# Patient Record
Sex: FEMALE | Race: WHITE | Employment: FULL TIME | URBAN - METROPOLITAN AREA
[De-identification: names, ages, dates, MRNs, and addresses within clinical notes are randomized per-mention and may not be internally consistent; named-entity substitution may affect disease eponyms.]

---

## 2017-08-03 ENCOUNTER — HOSPITAL ENCOUNTER (OUTPATIENT)
Dept: ULTRASOUND IMAGING | Age: 57
Discharge: HOME | End: 2017-08-03
Payer: COMMERCIAL

## 2017-08-03 ENCOUNTER — HOSPITAL ENCOUNTER (OUTPATIENT)
Dept: MAMMOGRAPHY | Age: 57
Discharge: HOME | End: 2017-08-03
Payer: COMMERCIAL

## 2017-08-03 DIAGNOSIS — N63.0 BREAST MASS: ICD-10-CM

## 2017-08-03 PROCEDURE — 76642 ULTRASOUND BREAST LIMITED: CPT

## 2017-08-03 PROCEDURE — 77065 DX MAMMO INCL CAD UNI: CPT

## 2017-08-09 ENCOUNTER — OFFICE VISIT (OUTPATIENT)
Dept: SURGERY | Age: 57
End: 2017-08-09

## 2017-08-09 VITALS
WEIGHT: 253 LBS | HEART RATE: 82 BPM | BODY MASS INDEX: 40.66 KG/M2 | SYSTOLIC BLOOD PRESSURE: 115 MMHG | DIASTOLIC BLOOD PRESSURE: 78 MMHG | HEIGHT: 66 IN

## 2017-08-09 DIAGNOSIS — N60.12 FIBROCYSTIC DISEASE OF LEFT BREAST: Primary | ICD-10-CM

## 2017-08-09 DIAGNOSIS — N64.4 MASTODYNIA OF LEFT BREAST: ICD-10-CM

## 2017-08-09 DIAGNOSIS — Z80.41 FAMILY HISTORY OF OVARIAN CANCER: ICD-10-CM

## 2017-08-09 RX ORDER — ESCITALOPRAM OXALATE 20 MG/1
TABLET ORAL
Refills: 5 | COMMUNITY
Start: 2017-07-12

## 2017-08-09 NOTE — LETTER
2017 4:26 PM 
 
Patient:  Jonathan Coy YOB: 1960 Date of Visit: 2017 Dear Jewel Mancia, [address] [place] [address] [address] [id number] VIA Facsimile: [phone] 
 : Thank you for referring Ms. Jonathan Coy to me for evaluation/treatment. Below are the relevant portions of my assessment and plan of care. HISTORY OF PRESENT ILLNESS Jonathan Coy is a 56 y.o. female. HPI  
NEW patient here today referred for consultation at the request of Dr. Josue Johnson for LEFT breast pain and lump. She started having pain in LEFT breast 1 month ago. She was then able to palpate a lump, which was tender to touch. Pain is slightly improved since first noticing. Denies any other breast problems at this time. She has been on estrogen for 14 years. Recently she altered her dosing in April. No history of breast biopsies. FH includes- 
Mother diagnosed with peritoneal/ovarian cancer at age 67, . Sister diagnosed with ovarian cancer at age 54, . Recent imaging- 
LEFT diagnostic mammogram and ultrasound on 8/3/17- BIRADS 2 
FINDINGS: No skin thickening or nipple retraction. No suspicious mass, cluster 
of pleomorphic calcifications, or architectural distortion to suggest 
malignancy. No mass or architectural distortion with compression. 
  
Left breast ultrasound at 1:30, 3 cm from the nipple: Oval, lobulated anechoic 
cyst with posterior acoustic enhancement measures 6 x 4 x 5 mm. No solid mass or 
pathologic shadowing. 
  
IMPRESSION:  
1. No mammographic or sonographic evidence of malignancy. 2. 6 mm cyst in the left breast at 1:30 may account for the palpable finding. 
  
Recommendation: 
Screening mammogram in one year. Monthly self breast exam. Any further 
management of the palpable area should be determined on a clinical basis. 
  
BI-RADS Assessment Category 2: Benign finding. Past Medical History:  
Diagnosis Date  Essential hypertension  Family history of skin cancer   
 mother and sister  Scarring  Skin cancer   
 squamous cell carcinoma  Sun-damaged skin Past Surgical History:  
Procedure Laterality Date [address]  HYSTERECTOMY  March 1997 Social History Social History  Marital status:  Spouse name: N/A  
 Number of children: N/A  
 Years of education: N/A Occupational History  Not on file. Social History Main Topics  Smoking status: Never Smoker  Smokeless tobacco: Not on file  Alcohol use Yes Comment: 2-3 glasses of wine per week  Drug use: Not on file  Sexual activity: Not on file Other Topics Concern  Not on file Social History Narrative OB History Obstetric Comments Menarche:  15. LMP: 38.  # of Children:  2. Age at Delivery of First Child:  34.   Hysterectomy/oophorectomy:  YES/YES. Breast Bx:  no.  Hx of Breast Feeding:  no. BCP:  yes. Hormone therapy:  yes. Current Outpatient Prescriptions:  
  escitalopram oxalate (LEXAPRO) 20 mg tablet, TAKE 1 TABLET EVERY DAY, Disp: , Rfl: 5 
  simvastatin (ZOCOR) 20 mg tablet, Take  by mouth nightly.  , Disp: , Rfl:  
  lisinopril (PRINIVIL, ZESTRIL) 10 mg tablet, Take  by mouth daily. , Disp: , Rfl:  
  omeprazole (PRILOSEC) 20 mg capsule, Take 20 mg by mouth daily. , Disp: , Rfl:  
  estradiol (ESTRACE) 0.5 mg tablet, Take  by mouth daily. , Disp: , Rfl:  
  multivitamin (ONE A DAY) tablet, Take 1 Tab by mouth daily. , Disp: , Rfl:  
  biotin 500 mcg cap, Take  by mouth.  , Disp: , Rfl:  
No Known Allergies Review of Systems Constitutional: Negative. HENT: Negative. Eyes: Negative. Respiratory: Positive for shortness of breath. Cardiovascular: Positive for leg swelling. Gastrointestinal: Positive for heartburn. Genitourinary: Negative. Musculoskeletal: Negative. Skin: Negative. Neurological: Positive for dizziness. Endo/Heme/Allergies: Negative. Psychiatric/Behavioral: Negative. Physical Exam  
Pulmonary/Chest: Right breast exhibits no inverted nipple, no mass, no nipple discharge, no skin change and no tenderness. Left breast exhibits no inverted nipple, no mass, no nipple discharge, no skin change and no tenderness. Breasts are symmetrical.  
Tender to deep palpation 3:00 left breast  
Lymphadenopathy:  
  She has no cervical adenopathy. She has no axillary adenopathy. Nursing note and vitals reviewed. ASSESSMENT and PLAN 
  ICD-10-CM ICD-9-CM 1. Fibrocystic disease of left breast N60.12 610.1 2. Mastodynia of left breast N64.4 611.71   
3. Family history of ovarian cancer Z80.41 V13.42   
 
55 yo female with left breast pain and cysts. On imaging and exam nothing worrisome. She is average risk on [facility] [place] risk assessment and does not qualify for breast mri. Discussed breast pain which is not typically a sign of breast cancer. I have given the patient a breast pain sheet. She may try vitamin E, Evening Primrose Oil, or decreased caffeine intake if breast pain persists. As far as the history of ovarian ca I did discuss genetic testing and recommend this. Will give her info and she will think about it. Follow-up with me in 6 months. If you have questions, please do not hesitate to call me. I look forward to following Ms. Farias along with you. Sincerely, Taryn Guerin MD

## 2017-08-09 NOTE — PATIENT INSTRUCTIONS
Breast Self-Exam: Care Instructions  Your Care Instructions  A breast self-exam is when you check your breasts for lumps or changes. This regular exam helps you learn how your breasts normally look and feel. Most breast problems or changes are not because of cancer. Breast self-exam is not a substitute for a mammogram. Having regular breast exams by your doctor and regular mammograms improve your chances of finding any problems with your breasts. Some women set a time each month to do a step-by-step breast self-exam. Other women like a less formal system. They might look at their breasts as they brush their teeth, or feel their breasts once in a while in the shower. If you notice a change in your breast, tell your doctor. Follow-up care is a key part of your treatment and safety. Be sure to make and go to all appointments, and call your doctor if you are having problems. Its also a good idea to know your test results and keep a list of the medicines you take. How do you do a breast self-exam?  · The best time to examine your breasts is usually one week after your menstrual period begins. Your breasts should not be tender then. If you do not have periods, you might do your exam on a day of the month that is easy to remember. · To examine your breasts:  ¨ Remove all your clothes above the waist and lie down. When you are lying down, your breast tissue spreads evenly over your chest wall, which makes it easier to feel all your breast tissue. ¨ Use the pads--not the fingertips--of the 3 middle fingers of your left hand to check your right breast. Move your fingers slowly in small coin-sized circles that overlap. ¨ Use three levels of pressure to feel of all your breast tissue. Use light pressure to feel the tissue close to the skin surface. Use medium pressure to feel a little deeper. Use firm pressure to feel your tissue close to your breastbone and ribs.  Use each pressure level to feel your breast tissue before moving on to the next spot. ¨ Check your entire breast, moving up and down as if following a strip from the collarbone to the bra line, and from the armpit to the ribs. Repeat until you have covered the entire breast.  ¨ Repeat this procedure for your left breast, using the pads of the 3 middle fingers of your right hand. · To examine your breasts while in the shower:  ¨ Place one arm over your head and lightly soap your breast on that side. ¨ Using the pads of your fingers, gently move your hand over your breast (in the strip pattern described above), feeling carefully for any lumps or changes. ¨ Repeat for the other breast.  · Have your doctor inspect anything you notice to see if you need further testing. Where can you learn more? Go to http://radha-barb.info/. Enter P148 in the search box to learn more about \"Breast Self-Exam: Care Instructions. \"  Current as of: July 26, 2016  Content Version: 11.3  © 3378-9958 [employer]. Care instructions adapted under license by [employer] (which disclaims liability or warranty for this information). If you have questions about a medical condition or this instruction, always ask your healthcare professional. [place] [address] any warranty or liability for your use of this information.

## 2017-08-09 NOTE — COMMUNICATION BODY
HISTORY OF PRESENT ILLNESS  Arturo Steven is a 56 y.o. female. HPI   NEW patient here today referred for consultation at the request of Dr. Nat Ashley for LEFT breast pain and lump. She started having pain in LEFT breast 1 month ago. She was then able to palpate a lump, which was tender to touch. Pain is slightly improved since first noticing. Denies any other breast problems at this time. She has been on estrogen for 14 years. Recently she altered her dosing in April. No history of breast biopsies. FH includes-  Mother diagnosed with peritoneal/ovarian cancer at age 67, . Sister diagnosed with ovarian cancer at age 54, . Recent imaging-  LEFT diagnostic mammogram and ultrasound on 8/3/17- BIRADS 2  FINDINGS: No skin thickening or nipple retraction. No suspicious mass, cluster  of pleomorphic calcifications, or architectural distortion to suggest  malignancy. No mass or architectural distortion with compression.     Left breast ultrasound at 1:30, 3 cm from the nipple: Oval, lobulated anechoic  cyst with posterior acoustic enhancement measures 6 x 4 x 5 mm. No solid mass or  pathologic shadowing.     IMPRESSION:   1. No mammographic or sonographic evidence of malignancy. 2. 6 mm cyst in the left breast at 1:30 may account for the palpable finding.     Recommendation:  Screening mammogram in one year. Monthly self breast exam. Any further  management of the palpable area should be determined on a clinical basis.     BI-RADS Assessment Category 2: Benign finding.   Past Medical History:   Diagnosis Date    Essential hypertension     Family history of skin cancer     mother and sister    Scarring     Skin cancer     squamous cell carcinoma     Sun-damaged skin      Past Surgical History:   Procedure Laterality Date    HX  SECTION   and     HX HYSTERECTOMY  1997     Social History     Social History    Marital status:      Spouse name: N/A    Number of children: N/A    Years of education: N/A     Occupational History    Not on file. Social History Main Topics    Smoking status: Never Smoker    Smokeless tobacco: Not on file    Alcohol use Yes      Comment: 2-3 glasses of wine per week     Drug use: Not on file    Sexual activity: Not on file     Other Topics Concern    Not on file     Social History Narrative     OB History     Obstetric Comments    Menarche:  15. LMP: 38.  # of Children:  2. Age at Delivery of First Child:  34.   Hysterectomy/oophorectomy:  YES/YES. Breast Bx:  no.  Hx of Breast Feeding:  no. BCP:  yes. Hormone therapy:  yes. Current Outpatient Prescriptions:     escitalopram oxalate (LEXAPRO) 20 mg tablet, TAKE 1 TABLET EVERY DAY, Disp: , Rfl: 5    simvastatin (ZOCOR) 20 mg tablet, Take  by mouth nightly.  , Disp: , Rfl:     lisinopril (PRINIVIL, ZESTRIL) 10 mg tablet, Take  by mouth daily. , Disp: , Rfl:     omeprazole (PRILOSEC) 20 mg capsule, Take 20 mg by mouth daily. , Disp: , Rfl:     estradiol (ESTRACE) 0.5 mg tablet, Take  by mouth daily. , Disp: , Rfl:     multivitamin (ONE A DAY) tablet, Take 1 Tab by mouth daily. , Disp: , Rfl:     biotin 500 mcg cap, Take  by mouth.  , Disp: , Rfl:   No Known Allergies    Review of Systems   Constitutional: Negative. HENT: Negative. Eyes: Negative. Respiratory: Positive for shortness of breath. Cardiovascular: Positive for leg swelling. Gastrointestinal: Positive for heartburn. Genitourinary: Negative. Musculoskeletal: Negative. Skin: Negative. Neurological: Positive for dizziness. Endo/Heme/Allergies: Negative. Psychiatric/Behavioral: Negative. Physical Exam   Pulmonary/Chest: Right breast exhibits no inverted nipple, no mass, no nipple discharge, no skin change and no tenderness. Left breast exhibits no inverted nipple, no mass, no nipple discharge, no skin change and no tenderness.  Breasts are symmetrical.   Tender to deep palpation 3:00 left breast   Lymphadenopathy:     She has no cervical adenopathy. She has no axillary adenopathy. Nursing note and vitals reviewed. ASSESSMENT and PLAN    ICD-10-CM ICD-9-CM    1. Fibrocystic disease of left breast N60.12 610.1    2. Mastodynia of left breast N64.4 611.71    3. Family history of ovarian cancer Z80.41 V13.42      55 yo female with left breast pain and cysts. On imaging and exam nothing worrisome. She is average risk on [facility] [place] risk assessment and does not qualify for breast mri. Discussed breast pain which is not typically a sign of breast cancer. I have given the patient a breast pain sheet. She may try vitamin E, Evening Primrose Oil, or decreased caffeine intake if breast pain persists. As far as the history of ovarian ca I did discuss genetic testing and recommend this. Will give her info and she will think about it. Follow-up with me in 6 months.

## 2017-08-09 NOTE — PROGRESS NOTES
HISTORY OF PRESENT ILLNESS  Arturo Steven is a 56 y.o. female. HPI   NEW patient here today referred for consultation at the request of Dr. Nat Ashley for LEFT breast pain and lump. She started having pain in LEFT breast 1 month ago. She was then able to palpate a lump, which was tender to touch. Pain is slightly improved since first noticing. Denies any other breast problems at this time. She has been on estrogen for 14 years. Recently she altered her dosing in April. No history of breast biopsies. FH includes-  Mother diagnosed with peritoneal/ovarian cancer at age 67, . Sister diagnosed with ovarian cancer at age 54, . Recent imaging-  LEFT diagnostic mammogram and ultrasound on 8/3/17- BIRADS 2  FINDINGS: No skin thickening or nipple retraction. No suspicious mass, cluster  of pleomorphic calcifications, or architectural distortion to suggest  malignancy. No mass or architectural distortion with compression.     Left breast ultrasound at 1:30, 3 cm from the nipple: Oval, lobulated anechoic  cyst with posterior acoustic enhancement measures 6 x 4 x 5 mm. No solid mass or  pathologic shadowing.     IMPRESSION:   1. No mammographic or sonographic evidence of malignancy. 2. 6 mm cyst in the left breast at 1:30 may account for the palpable finding.     Recommendation:  Screening mammogram in one year. Monthly self breast exam. Any further  management of the palpable area should be determined on a clinical basis.     BI-RADS Assessment Category 2: Benign finding.   Past Medical History:   Diagnosis Date    Essential hypertension     Family history of skin cancer     mother and sister    Scarring     Skin cancer     squamous cell carcinoma     Sun-damaged skin      Past Surgical History:   Procedure Laterality Date    HX  SECTION   and     HX HYSTERECTOMY  1997     Social History     Social History    Marital status:      Spouse name: N/A    Number of children: N/A    Years of education: N/A     Occupational History    Not on file. Social History Main Topics    Smoking status: Never Smoker    Smokeless tobacco: Not on file    Alcohol use Yes      Comment: 2-3 glasses of wine per week     Drug use: Not on file    Sexual activity: Not on file     Other Topics Concern    Not on file     Social History Narrative     OB History     Obstetric Comments    Menarche:  15. LMP: 38.  # of Children:  2. Age at Delivery of First Child:  34.   Hysterectomy/oophorectomy:  YES/YES. Breast Bx:  no.  Hx of Breast Feeding:  no. BCP:  yes. Hormone therapy:  yes. Current Outpatient Prescriptions:     escitalopram oxalate (LEXAPRO) 20 mg tablet, TAKE 1 TABLET EVERY DAY, Disp: , Rfl: 5    simvastatin (ZOCOR) 20 mg tablet, Take  by mouth nightly.  , Disp: , Rfl:     lisinopril (PRINIVIL, ZESTRIL) 10 mg tablet, Take  by mouth daily. , Disp: , Rfl:     omeprazole (PRILOSEC) 20 mg capsule, Take 20 mg by mouth daily. , Disp: , Rfl:     estradiol (ESTRACE) 0.5 mg tablet, Take  by mouth daily. , Disp: , Rfl:     multivitamin (ONE A DAY) tablet, Take 1 Tab by mouth daily. , Disp: , Rfl:     biotin 500 mcg cap, Take  by mouth.  , Disp: , Rfl:   No Known Allergies    Review of Systems   Constitutional: Negative. HENT: Negative. Eyes: Negative. Respiratory: Positive for shortness of breath. Cardiovascular: Positive for leg swelling. Gastrointestinal: Positive for heartburn. Genitourinary: Negative. Musculoskeletal: Negative. Skin: Negative. Neurological: Positive for dizziness. Endo/Heme/Allergies: Negative. Psychiatric/Behavioral: Negative. Physical Exam   Pulmonary/Chest: Right breast exhibits no inverted nipple, no mass, no nipple discharge, no skin change and no tenderness. Left breast exhibits no inverted nipple, no mass, no nipple discharge, no skin change and no tenderness.  Breasts are symmetrical.   Tender to deep palpation 3:00 left breast   Lymphadenopathy:     She has no cervical adenopathy. She has no axillary adenopathy. Nursing note and vitals reviewed. ASSESSMENT and PLAN    ICD-10-CM ICD-9-CM    1. Fibrocystic disease of left breast N60.12 610.1    2. Mastodynia of left breast N64.4 611.71    3. Family history of ovarian cancer Z80.41 V13.42      55 yo female with left breast pain and cysts. On imaging and exam nothing worrisome. She is average risk on [facility] [place] risk assessment and does not qualify for breast mri. Discussed breast pain which is not typically a sign of breast cancer. I have given the patient a breast pain sheet. She may try vitamin E, Evening Primrose Oil, or decreased caffeine intake if breast pain persists. As far as the history of ovarian ca I did discuss genetic testing and recommend this. Will give her info and she will think about it. Follow-up with me in 6 months.

## 2019-10-10 ENCOUNTER — HOSPITAL ENCOUNTER (OUTPATIENT)
Dept: MAMMOGRAPHY | Age: 59
Discharge: HOME | End: 2019-10-10
Attending: NURSE PRACTITIONER
Payer: COMMERCIAL

## 2019-10-10 DIAGNOSIS — Z12.31 ENCOUNTER FOR SCREENING MAMMOGRAM FOR BREAST CANCER: ICD-10-CM

## 2019-10-10 PROCEDURE — 77067 SCR MAMMO BI INCL CAD: CPT

## 2019-11-04 ENCOUNTER — HOSPITAL ENCOUNTER (OUTPATIENT)
Dept: MAMMOGRAPHY | Age: 59
Discharge: HOME | End: 2019-11-04
Attending: NURSE PRACTITIONER
Payer: COMMERCIAL

## 2019-11-04 DIAGNOSIS — R92.8 ABNORMAL MAMMOGRAM: ICD-10-CM

## 2019-11-04 PROCEDURE — 77061 BREAST TOMOSYNTHESIS UNI: CPT

## 2019-11-18 ENCOUNTER — HOSPITAL ENCOUNTER (OUTPATIENT)
Dept: GENERAL RADIOLOGY | Age: 59
Discharge: HOME | End: 2019-11-18
Attending: PHYSICIAN ASSISTANT
Payer: COMMERCIAL

## 2019-11-18 ENCOUNTER — HOSPITAL ENCOUNTER (OUTPATIENT)
Dept: GENERAL RADIOLOGY | Age: 59
Discharge: HOME | End: 2019-11-18
Attending: SPECIALIST
Payer: COMMERCIAL

## 2019-11-18 DIAGNOSIS — R13.10 DYSPHAGIA: ICD-10-CM

## 2019-11-18 DIAGNOSIS — K59.04 CHRONIC IDIOPATHIC CONSTIPATION: ICD-10-CM

## 2019-11-18 DIAGNOSIS — K21.9 GASTROESOPHAGEAL REFLUX DISEASE: ICD-10-CM

## 2019-11-18 DIAGNOSIS — Z80.9 FAMILY HISTORY OF UNSPECIFIED MALIGNANT NEOPLASM: ICD-10-CM

## 2019-11-18 DIAGNOSIS — K21.9 GERD (GASTROESOPHAGEAL REFLUX DISEASE): ICD-10-CM

## 2019-11-18 PROCEDURE — 74220 X-RAY XM ESOPHAGUS 1CNTRST: CPT

## 2019-11-18 PROCEDURE — 74018 RADEX ABDOMEN 1 VIEW: CPT

## 2019-11-18 PROCEDURE — 92611 MOTION FLUOROSCOPY/SWALLOW: CPT

## 2019-11-18 PROCEDURE — 74230 X-RAY XM SWLNG FUNCJ C+: CPT

## 2020-10-02 ENCOUNTER — APPOINTMENT (OUTPATIENT)
Dept: GENERAL RADIOLOGY | Age: 60
End: 2020-10-02
Attending: PHYSICIAN ASSISTANT
Payer: COMMERCIAL

## 2020-10-02 ENCOUNTER — APPOINTMENT (OUTPATIENT)
Dept: ULTRASOUND IMAGING | Age: 60
End: 2020-10-02
Attending: PHYSICIAN ASSISTANT
Payer: COMMERCIAL

## 2020-10-02 ENCOUNTER — HOSPITAL ENCOUNTER (EMERGENCY)
Age: 60
Discharge: HOME | End: 2020-10-02
Attending: EMERGENCY MEDICINE
Payer: COMMERCIAL

## 2020-10-02 VITALS
HEART RATE: 86 BPM | SYSTOLIC BLOOD PRESSURE: 130 MMHG | BODY MASS INDEX: 39.22 KG/M2 | TEMPERATURE: 98.1 F | RESPIRATION RATE: 16 BRPM | OXYGEN SATURATION: 96 % | DIASTOLIC BLOOD PRESSURE: 77 MMHG | WEIGHT: 244.05 LBS | HEIGHT: 66 IN

## 2020-10-02 DIAGNOSIS — M25.462 PAIN AND SWELLING OF LEFT KNEE: Primary | ICD-10-CM

## 2020-10-02 DIAGNOSIS — M25.562 PAIN AND SWELLING OF LEFT KNEE: Primary | ICD-10-CM

## 2020-10-02 PROCEDURE — 99283 EMERGENCY DEPT VISIT LOW MDM: CPT

## 2020-10-02 PROCEDURE — 93971 EXTREMITY STUDY: CPT

## 2020-10-02 PROCEDURE — 73562 X-RAY EXAM OF KNEE 3: CPT

## 2020-10-02 NOTE — DISCHARGE INSTRUCTIONS
Patient Education        Knee Pain or Injury: Care Instructions  Your Care Instructions     Injuries are a common cause of knee problems. Sudden (acute) injuries may be caused by a direct blow to the knee. They can also be caused by abnormal twisting, bending, or falling on the knee. Pain, bruising, or swelling may be severe, and may start within minutes of the injury. Overuse is another cause of knee pain. Other causes are climbing stairs, kneeling, and other activities that use the knee. Everyday wear and tear, especially as you get older, also can cause knee pain. Rest, along with home treatment, often relieves pain and allows your knee to heal. If you have a serious knee injury, you may need tests and treatment. Follow-up care is a key part of your treatment and safety. Be sure to make and go to all appointments, and call your doctor if you are having problems. It's also a good idea to know your test results and keep a list of the medicines you take. How can you care for yourself at home? · Be safe with medicines. Read and follow all instructions on the label. ? If the doctor gave you a prescription medicine for pain, take it as prescribed. ? If you are not taking a prescription pain medicine, ask your doctor if you can take an over-the-counter medicine. · Rest and protect your knee. Take a break from any activity that may cause pain. · Put ice or a cold pack on your knee for 10 to 20 minutes at a time. Put a thin cloth between the ice and your skin. · Prop up a sore knee on a pillow when you ice it or anytime you sit or lie down for the next 3 days. Try to keep it above the level of your heart. This will help reduce swelling. · If your knee is not swollen, you can put moist heat, a heating pad, or a warm cloth on your knee. · If your doctor recommends an elastic bandage, sleeve, or other type of support for your knee, wear it as directed.   · Follow your doctor's instructions about how much weight you can put on your leg. Use a cane, crutches, or a walker as instructed. · Follow your doctor's instructions about activity during your healing process. If you can do mild exercise, slowly increase your activity. · Reach and stay at a healthy weight. Extra weight can strain the joints, especially the knees and hips, and make the pain worse. Losing even a few pounds may help. When should you call for help? Call 911 anytime you think you may need emergency care. For example, call if:    · You have symptoms of a blood clot in your lung (called a pulmonary embolism). These may include:  ? Sudden chest pain. ? Trouble breathing. ? Coughing up blood. Call your doctor now or seek immediate medical care if:    · You have severe or increasing pain.     · Your leg or foot turns cold or changes color.     · You cannot stand or put weight on your knee.     · Your knee looks twisted or bent out of shape.     · You cannot move your knee.     · You have signs of infection, such as:  ? Increased pain, swelling, warmth, or redness. ? Red streaks leading from the knee. ? Pus draining from a place on your knee. ? A fever.     · You have signs of a blood clot in your leg (called a deep vein thrombosis), such as:  ? Pain in your calf, back of the knee, thigh, or groin. ? Redness and swelling in your leg or groin. Watch closely for changes in your health, and be sure to contact your doctor if:    · You have tingling, weakness, or numbness in your knee.     · You have any new symptoms, such as swelling.     · You have bruises from a knee injury that last longer than 2 weeks.     · You do not get better as expected. Where can you learn more? Go to http://www.gray.com/  Enter K195 in the search box to learn more about \"Knee Pain or Injury: Care Instructions. \"  Current as of: June 26, 2019               Content Version: 12.6  © 1284-3955 [employer].    Care instructions adapted under license by [address] (which disclaims liability or warranty for this information). If you have questions about a medical condition or this instruction, always ask your healthcare professional. [place] [address] any warranty or liability for your use of this information. Xr Knee Lt 3 V    Result Date: 10/2/2020  IMPRESSION: No acute abnormality. Left Lower Venous Doppler    No evidence of deep vein thrombosis in the common femoral, profunda femoral, femoral, popliteal, posterior tibial, and peroneal veins. The veins were imaged in the transverse and longitudinal planes. The vessels showed normal color filling and compressibility. Doppler interrogation showed phasic and spontaneous flow.

## 2020-10-02 NOTE — ED PROVIDER NOTES
EMERGENCY DEPARTMENT HISTORY AND PHYSICAL EXAM      Date: 10/2/2020  Patient Name: Lise Willis    History of Presenting Illness     Chief Complaint   Patient presents with    Knee Pain     Pain at posterior left knee radiating to front for 3-4 days, swelling reported       History Provided By: Patient and Patient's     HPI: Lise Willis, 64 y.o. female with PMHx significant for hypertension, hypercholesterolemia, presents to the ED with cc of left knee pain and swelling onset 4 days ago. Symptoms have been persistent. Pain is worse with ambulation and movement of the joint. She cannot think of anything that she did to injure it but she was diagnosed with osteoarthritis years ago and was on her feet for approximately 14 hours at her son's rehearsal dinner 2 weeks ago. Her sister was diagnosed with a DVT 1 year ago and she is concerned that she could have one too. She denies history of DVT, recent immobilization, hormonal medication use. She denies fevers. There are no other complaints, changes, or physical findings at this time. PCP: Lindsay Jean Baptiste NP    No current facility-administered medications on file prior to encounter. Current Outpatient Medications on File Prior to Encounter   Medication Sig Dispense Refill    escitalopram oxalate (LEXAPRO) 20 mg tablet TAKE 1 TABLET EVERY DAY  5    simvastatin (ZOCOR) 20 mg tablet Take  by mouth nightly.  lisinopril (PRINIVIL, ZESTRIL) 10 mg tablet Take  by mouth daily.  omeprazole (PRILOSEC) 20 mg capsule Take 20 mg by mouth daily.  estradiol (ESTRACE) 0.5 mg tablet Take  by mouth daily.  multivitamin (ONE A DAY) tablet Take 1 Tab by mouth daily.  biotin 500 mcg cap Take  by mouth.            Past History     Past Medical History:  Past Medical History:   Diagnosis Date    Essential hypertension     Family history of skin cancer     mother and sister    Scarring     Skin cancer     squamous cell carcinoma  Sun-damaged skin        Past Surgical History:  Past Surgical History:   Procedure Laterality Date    HX  SECTION   and     HX HYSTERECTOMY  1997       Family History:  Family History   Problem Relation Age of Onset    Diabetes Mother     Cancer Mother         peritoneal    Heart Disease Father     Ovarian Cancer Sister     Diabetes Brother     Heart Disease Brother     Heart Disease Brother     Diabetes Brother        Social History:  Social History     Tobacco Use    Smoking status: Never Smoker   Substance Use Topics    Alcohol use: Yes     Comment: 2-3 glasses of wine per week     Drug use: Not on file       Allergies:  No Known Allergies      Review of Systems   Review of Systems   Constitutional: Negative for chills and fever. HENT: Negative for ear pain and sore throat. Eyes: Negative for redness and visual disturbance. Respiratory: Negative for cough and shortness of breath. Cardiovascular: Negative for chest pain and palpitations. Gastrointestinal: Negative for abdominal pain, nausea and vomiting. Genitourinary: Negative for dysuria and hematuria. Musculoskeletal: Positive for arthralgias and joint swelling. Negative for back pain and gait problem. Skin: Negative for rash and wound. Neurological: Negative for dizziness and headaches. Psychiatric/Behavioral: Negative for behavioral problems and confusion. All other systems reviewed and are negative. Physical Exam   Physical Exam  Constitutional:       Appearance: She is not toxic-appearing. Comments: Conversant female no acute distress. HENT:      Head: Normocephalic and atraumatic. Mouth/Throat:      Mouth: Mucous membranes are moist.   Eyes:      Extraocular Movements: Extraocular movements intact. Pupils: Pupils are equal, round, and reactive to light. Neck:      Musculoskeletal: Normal range of motion and neck supple.    Cardiovascular:      Rate and Rhythm: Normal rate and regular rhythm. Pulmonary:      Effort: Pulmonary effort is normal. No respiratory distress. Musculoskeletal:      Comments: Soft tissue swelling noted to the left knee. Tenderness to palpation over the posterior knee and over the medial and lateral joint lines. Full range of motion in the joint without instability. No erythema or warmth. Mild tenderness to palpation in the proximal calf. 2+ dorsalis pedis pulse. Skin:     General: Skin is warm and dry. Neurological:      General: No focal deficit present. Mental Status: She is alert and oriented to person, place, and time. Psychiatric:         Behavior: Behavior normal.           Diagnostic Study Results     Labs -   No results found for this or any previous visit (from the past 12 hour(s)). Radiologic Studies -   XR KNEE LT 3 V   Final Result   IMPRESSION: No acute abnormality. CT Results  (Last 48 hours)    None        CXR Results  (Last 48 hours)    None            Medical Decision Making   I am the first provider for this patient. I reviewed the vital signs, available nursing notes, past medical history, past surgical history, family history and social history. Vital Signs-Reviewed the patient's vital signs. Patient Vitals for the past 12 hrs:   Temp Pulse Resp BP SpO2   10/02/20 1059 98.1 °F (36.7 °C) 86 16 130/77 96 %         Records Reviewed: Nursing Notes and Old Medical Records      Provider Notes (Medical Decision Making):   DDx: Osteoarthritis, meniscus tear, sprain, DVT    Patient is afebrile and clinically well-appearing. No erythema or warmth to the joint and she has full range of motion. Plan for x-ray and Doppler ultrasound. She declines analgesia at this time. ED Course:   Initial assessment performed. The patients presenting problems have been discussed, and they are in agreement with the care plan formulated and outlined with them.   I have encouraged them to ask questions as they arise throughout their visit. 1:15 PM - Discussed that doppler is negative for DVT. Will apply knee immobilizer, discussed RICE. Will give ortho referral for further evaluation and management. Disposition:  1:20 PM  The patient has been re-evaluated and is ready for discharge. Reviewed available results with patient. Counseled patient on diagnosis and care plan. Patient has expressed understanding, and all questions have been answered. Patient agrees with plan and agrees to follow up as recommended, or to return to the ED if their symptoms worsen. Discharge instructions have been provided and explained to the patient, along with reasons to return to the ED. PLAN:  1. Discharge Medication List as of 10/2/2020  1:17 PM        2. Follow-up Information     Follow up With Specialties Details Why [address] [facility] [place]  Call to schedule a follow up appointment [address] [facility] [place] [address] [facility]    [address] DEPT Emergency Medicine Go to If symptoms worsen [address] [place] [address] Drive  [address] [facility]  [phone]        Return to ED if worse     Diagnosis     Clinical Impression:   1. Pain and swelling of left knee            Edwina Marie.  PEDRO Delacruz

## 2020-10-02 NOTE — ED NOTES
11: 08 Assumed care of pt. Plan of care discussed. Call bell in reach. Will continue to monitor. PA at bedside.

## 2020-12-08 ENCOUNTER — HOSPITAL ENCOUNTER (OUTPATIENT)
Dept: PREADMISSION TESTING | Age: 60
Discharge: HOME | End: 2020-12-08
Payer: COMMERCIAL

## 2020-12-08 VITALS
WEIGHT: 245.15 LBS | BODY MASS INDEX: 39.4 KG/M2 | TEMPERATURE: 98.2 F | SYSTOLIC BLOOD PRESSURE: 130 MMHG | RESPIRATION RATE: 18 BRPM | HEART RATE: 85 BPM | HEIGHT: 66 IN | DIASTOLIC BLOOD PRESSURE: 82 MMHG

## 2020-12-08 LAB
ABO + RH BLD: NORMAL
ANION GAP SERPL CALC-SCNC: 4 MMOL/L (ref 5–15)
APPEARANCE UR: CLEAR
BACTERIA URNS QL MICRO: NEGATIVE /HPF
BILIRUB UR QL: NEGATIVE
BLOOD GROUP ANTIBODIES SERPL: NORMAL
BUN SERPL-MCNC: 20 MG/DL (ref 6–20)
BUN/CREAT SERPL: 22 (ref 12–20)
CALCIUM SERPL-MCNC: 9.1 MG/DL (ref 8.5–10.1)
CHLORIDE SERPL-SCNC: 108 MMOL/L (ref 97–108)
CO2 SERPL-SCNC: 27 MMOL/L (ref 21–32)
COLOR UR: ABNORMAL
CREAT SERPL-MCNC: 0.9 MG/DL (ref 0.55–1.02)
EPITH CASTS URNS QL MICRO: ABNORMAL /LPF
ERYTHROCYTE [DISTWIDTH] IN BLOOD BY AUTOMATED COUNT: 13.1 % (ref 11.5–14.5)
EST. AVERAGE GLUCOSE BLD GHB EST-MCNC: 117 MG/DL
GLUCOSE SERPL-MCNC: 82 MG/DL (ref 65–100)
GLUCOSE UR STRIP.AUTO-MCNC: NEGATIVE MG/DL
HBA1C MFR BLD: 5.7 % (ref 4–5.6)
HCT VFR BLD AUTO: 39 % (ref 35–47)
HGB BLD-MCNC: 12.7 G/DL (ref 11.5–16)
HGB UR QL STRIP: ABNORMAL
HYALINE CASTS URNS QL MICRO: ABNORMAL /LPF (ref 0–5)
INR PPP: 1 (ref 0.9–1.1)
KETONES UR QL STRIP.AUTO: ABNORMAL MG/DL
LEUKOCYTE ESTERASE UR QL STRIP.AUTO: NEGATIVE
MCH RBC QN AUTO: 30.2 PG (ref 26–34)
MCHC RBC AUTO-ENTMCNC: 32.6 G/DL (ref 30–36.5)
MCV RBC AUTO: 92.9 FL (ref 80–99)
NITRITE UR QL STRIP.AUTO: NEGATIVE
NRBC # BLD: 0 K/UL (ref 0–0.01)
NRBC BLD-RTO: 0 PER 100 WBC
PH UR STRIP: 5 [PH] (ref 5–8)
PLATELET # BLD AUTO: 288 K/UL (ref 150–400)
PMV BLD AUTO: 10.3 FL (ref 8.9–12.9)
POTASSIUM SERPL-SCNC: 4.1 MMOL/L (ref 3.5–5.1)
PROT UR STRIP-MCNC: NEGATIVE MG/DL
PROTHROMBIN TIME: 10.9 SEC (ref 9–11.1)
RBC # BLD AUTO: 4.2 M/UL (ref 3.8–5.2)
RBC #/AREA URNS HPF: ABNORMAL /HPF (ref 0–5)
SODIUM SERPL-SCNC: 139 MMOL/L (ref 136–145)
SP GR UR REFRACTOMETRY: 1.02 (ref 1–1.03)
SPECIMEN EXP DATE BLD: NORMAL
UA: UC IF INDICATED,UAUC: ABNORMAL
UROBILINOGEN UR QL STRIP.AUTO: 1 EU/DL (ref 0.2–1)
WBC # BLD AUTO: 7.6 K/UL (ref 3.6–11)
WBC URNS QL MICRO: ABNORMAL /HPF (ref 0–4)

## 2020-12-08 PROCEDURE — 81001 URINALYSIS AUTO W/SCOPE: CPT

## 2020-12-08 PROCEDURE — 85610 PROTHROMBIN TIME: CPT

## 2020-12-08 PROCEDURE — 86900 BLOOD TYPING SEROLOGIC ABO: CPT

## 2020-12-08 PROCEDURE — 85027 COMPLETE CBC AUTOMATED: CPT

## 2020-12-08 PROCEDURE — 36415 COLL VENOUS BLD VENIPUNCTURE: CPT

## 2020-12-08 PROCEDURE — 83036 HEMOGLOBIN GLYCOSYLATED A1C: CPT

## 2020-12-08 PROCEDURE — 80048 BASIC METABOLIC PNL TOTAL CA: CPT

## 2020-12-08 PROCEDURE — 93005 ELECTROCARDIOGRAM TRACING: CPT

## 2020-12-08 RX ORDER — CHOLECALCIFEROL (VITAMIN D3) 125 MCG
100 CAPSULE ORAL 2 TIMES DAILY
COMMUNITY

## 2020-12-08 RX ORDER — VENLAFAXINE HYDROCHLORIDE 37.5 MG/1
CAPSULE, EXTENDED RELEASE ORAL
COMMUNITY

## 2020-12-08 RX ORDER — CETIRIZINE HCL 10 MG
10 TABLET ORAL DAILY
COMMUNITY

## 2020-12-08 RX ORDER — LANSOPRAZOLE 15 MG/1
15 TABLET, ORALLY DISINTEGRATING, DELAYED RELEASE ORAL
COMMUNITY

## 2020-12-08 NOTE — PERIOP NOTES
PREOPERATIVE INSTRUCTIONS REVIEWED WITH PATIENT. INSTRUCTIONS (link to online class given to pt) ON USE OF CHG SOAP. TWO BOTTLES OF CHG SOAP GIVEN. PATIENT GIVEN SSI INFECTIONS SHEET, AS WELL AS HAND WASHING TIPS SHEET. MRSA/MSSA TREATMENT INSTRUCTION SHEET GIVEN WITH AN EXPLANATION TO PATIENT THAT THEY WILL BE NOTIFIED IF TREATMENT INSTRUCTIONS NEED TO BE INITIATED. PATIENT WAS GIVEN THE OPPORTUNITY TO ASK QUESTIONS, BASED ON THE INFORMATION PROVIDED.

## 2020-12-09 LAB
ATRIAL RATE: 75 BPM
BACTERIA SPEC CULT: NORMAL
BACTERIA SPEC CULT: NORMAL
CALCULATED P AXIS, ECG09: 53 DEGREES
CALCULATED R AXIS, ECG10: 4 DEGREES
CALCULATED T AXIS, ECG11: 35 DEGREES
DIAGNOSIS, 93000: NORMAL
P-R INTERVAL, ECG05: 140 MS
Q-T INTERVAL, ECG07: 404 MS
QRS DURATION, ECG06: 90 MS
QTC CALCULATION (BEZET), ECG08: 451 MS
SERVICE CMNT-IMP: NORMAL
VENTRICULAR RATE, ECG03: 75 BPM

## 2020-12-09 NOTE — H&P
Jessica Barakat  : 1960   / Language: English / Race: White  Female      History of Present Illness The patient is a 64year old female who presents with a complaint of MRI of the left knee. Patient's chief complaint is left knee pain. Kayla Montano is here for return and MRI results. Donna Mancuso reviewed her MRI which showed anterior medial osteoarthritis of the knee with meniscal subluxation.  She has not improved with the therapy.  The injection helped for only a few weeks.  Pain is anterior medial.  Associated with swelling and dysfunction. Problem List/Past Medical   Pain of right heel (729.5  M79.671)    Obesity (BMI 30-39.9) (278.00  E66.9)    Hypertension, goal below 140/90 (401.9  I10)    Right medial knee pain (719.46  M25.561)    History of foot surgery (V15.29  Z98.890)    Plantar fasciitis, right (728.71  M72.2)    REVIEW OF SYSTEMS: Systems were reviewed by the provider.    Knee effusion, left (719.06  M25.462)    Weight above 97th percentile (V49.89  Z78.9)    Degenerative tear of medial meniscus, left (717.3  M23.204)    Synovitis of left knee (727.09  M65.9)    Chronic right shoulder pain (719.41  M25.511)    Shoulder impingement, right (726.2  M75.41)    Superior glenoid labrum lesion of right shoulder, initial encounter (840.7  S43.431A)      Allergies  Sulfa 10 *OPHTHALMIC AGENTS*    Allergies Reconciled      Family History   Cancer   Mother, Sister. Diabetes Mellitus   Brother, Mother. Heart Disease   Father. Heart disease in male family member before age 54    Hypertension   Mother. Social History  Exercise   3-4 times per week, walking. Marital status   . No drug use    Current work status   Part-time. Alcohol use   1-2 drinks per occasion, 3 times, Drinks wine, Never drinks more than 5 drinks per occasion, per week. Caffeine use   1-2 drinks per day, Carbonated beverages, Coffee. Tobacco use   Never smoker. Seat Belt Use   Always uses seat belts.   Brayden Weir Exposure   Occasionally. Tobacco / smoke exposure   Family members smoke outdoors only. Medication History  LORazepam  (1MG Tablet, Oral) Active. Amoxicillin  (500MG Capsule, Oral) Active. Chlorhexidine Gluconate  (0.12% Solution, Mouth/Throat) Active. Ibuprofen  (800MG Tablet, Oral) Active. Venlafaxine HCl  (37.5MG Tablet, Oral) Active. Medications Reconciled     Pregnancy / Birth History  Pregnant   No.    Past Surgical History   Delivery   2 times  Hysterectomy   non-cancerous: vaginal  Other Joint Surgery      Other Problems   Skin Cancer    Gastroesophageal Reflux Disease    High blood pressure    Hypercholesterolemia    Depression    Treatment options were discussed with the patient in full.        Review of Systems   General Not Present- Chills and Fatigue. Skin Not Present- Bruising, Pallor and Skin Color Changes. Respiratory Not Present- Cough and Difficulty Breathing. Cardiovascular Not Present- Chest Pain, Fainting / Blacking Out and Rapid Heart Rate. Musculoskeletal Present- Joint Pain. Not Present- Decreased Range of Motion and Joint Swelling. Neurological Not Present- Dysesthesia, Paresthesias and Weakness In Extremities. Hematology Not Present- Abnormal Bleeding, Blood Clots and Petechiae. Vitals   Weight: 243 lb   Height: 66 in   Weight was reported by patient. Height was reported by patient. Body Surface Area: 2.17 m²   Body Mass Index: 39.22 kg/m²        Physical Exam  Musculoskeletal  Global Assessment  Examination of related systems reveals - well-developed, well-nourished, in no acute distress, alert and oriented x 3. Left Lower Extremity - Note: Hip was examined and has painless range of motion with negative impingement and apprehension sign. Straight leg raise and femoral nerve stretch test are negative. Lower extremity has palpable dorsalis pedis pulse. Skin is intact and foot is sensate and well-perfused. Varus malalignment of about 5 degrees.  Pain is present along the medial joint line. There is a mild effusion. Range of motion 0 to 120 degrees. Motor testing reveals 5 out of 5 strength of the hip flexors, quads, ankle plantar flexors, and ankle dorsiflexors. Assessment & Plan     Localized osteoarthritis of left knee (715.36  M17.12)  Impression: MRI and x-rays were compared. MRI shows anterior medial bone-on-bone osteoarthritis. X-rays confirm significant joint space narrowing with cyst formation and subluxation. Discussed options. Not improved with conservative treatment options. Patient is inclined to proceed with surgery. All questions were answered. She was scheduled for a total knee replacement. Current Plans  Pt Education - How to [address] using Patient Portal and 3rd Party Apps: discussed with patient and provided information. Pt Education - Educational materials were provided.: discussed with patient and provided information. X-RAY EXAM KNEE COMPLETE 4 OR MORE (74901) (LT PA Flex, Lateral, Tunnel and bilateral Peculiar Patella views were taken today using Digital Radiography. 3 views left knee. Near bone-on-bone medially with medial subluxation of the femur on the tibia.  Significant subchondral scleros)    REVIEW OF SYSTEMS: Systems were reviewed by the provider.(V49.9)      Weight above 97th percentile (V49.89  Z78.9)    Current Plans  LIFESTYLE EDUCATION REGARDING DIET (40932)

## 2020-12-10 ENCOUNTER — TRANSCRIBE ORDER (OUTPATIENT)
Dept: REGISTRATION | Age: 60
End: 2020-12-10

## 2020-12-10 ENCOUNTER — HOSPITAL ENCOUNTER (OUTPATIENT)
Dept: PREADMISSION TESTING | Age: 60
Discharge: HOME | End: 2020-12-10
Attending: ORTHOPAEDIC SURGERY
Payer: COMMERCIAL

## 2020-12-10 DIAGNOSIS — Z01.812 PRE-PROCEDURE LAB EXAM: Primary | ICD-10-CM

## 2020-12-10 DIAGNOSIS — Z01.812 PRE-PROCEDURE LAB EXAM: ICD-10-CM

## 2020-12-10 PROCEDURE — 87635 SARS-COV-2 COVID-19 AMP PRB: CPT

## 2020-12-11 LAB — SARS-COV-2, COV2NT: NOT DETECTED

## 2020-12-14 ENCOUNTER — ANESTHESIA (OUTPATIENT)
Dept: SURGERY | Age: 60
End: 2020-12-14
Payer: COMMERCIAL

## 2020-12-14 ENCOUNTER — HOSPITAL ENCOUNTER (OUTPATIENT)
Age: 60
Setting detail: OBSERVATION
Discharge: HOME HEALTH | End: 2020-12-15
Attending: ORTHOPAEDIC SURGERY | Admitting: ORTHOPAEDIC SURGERY
Payer: COMMERCIAL

## 2020-12-14 ENCOUNTER — ANESTHESIA EVENT (OUTPATIENT)
Dept: SURGERY | Age: 60
End: 2020-12-14
Payer: COMMERCIAL

## 2020-12-14 DIAGNOSIS — Z96.652 STATUS POST TOTAL LEFT KNEE REPLACEMENT: ICD-10-CM

## 2020-12-14 DIAGNOSIS — M17.12 LOCALIZED OSTEOARTHRITIS OF LEFT KNEE: Primary | ICD-10-CM

## 2020-12-14 LAB
GLUCOSE BLD STRIP.AUTO-MCNC: 96 MG/DL (ref 65–100)
SERVICE CMNT-IMP: NORMAL

## 2020-12-14 PROCEDURE — 74011250636 HC RX REV CODE- 250/636: Performed by: ANESTHESIOLOGY

## 2020-12-14 PROCEDURE — C1713 ANCHOR/SCREW BN/BN,TIS/BN: HCPCS | Performed by: ORTHOPAEDIC SURGERY

## 2020-12-14 PROCEDURE — 77030008463 HC STPLR SKN PROX J&J -B: Performed by: ORTHOPAEDIC SURGERY

## 2020-12-14 PROCEDURE — 74011250636 HC RX REV CODE- 250/636: Performed by: NURSE ANESTHETIST, CERTIFIED REGISTERED

## 2020-12-14 PROCEDURE — 74011000258 HC RX REV CODE- 258: Performed by: ORTHOPAEDIC SURGERY

## 2020-12-14 PROCEDURE — 76210000006 HC OR PH I REC 0.5 TO 1 HR: Performed by: ORTHOPAEDIC SURGERY

## 2020-12-14 PROCEDURE — 97161 PT EVAL LOW COMPLEX 20 MIN: CPT

## 2020-12-14 PROCEDURE — 82962 GLUCOSE BLOOD TEST: CPT

## 2020-12-14 PROCEDURE — 99218 HC RM OBSERVATION: CPT

## 2020-12-14 PROCEDURE — 74011250637 HC RX REV CODE- 250/637: Performed by: PHYSICIAN ASSISTANT

## 2020-12-14 PROCEDURE — 74011000250 HC RX REV CODE- 250: Performed by: NURSE ANESTHETIST, CERTIFIED REGISTERED

## 2020-12-14 PROCEDURE — 77030031139 HC SUT VCRL2 J&J -A: Performed by: ORTHOPAEDIC SURGERY

## 2020-12-14 PROCEDURE — 97530 THERAPEUTIC ACTIVITIES: CPT

## 2020-12-14 PROCEDURE — 77030028907 HC WRP KNEE WO BGS SOLM -B

## 2020-12-14 PROCEDURE — 77030006822 HC BLD SAW SAG BRSM -B: Performed by: ORTHOPAEDIC SURGERY

## 2020-12-14 PROCEDURE — 77030035236 HC SUT PDS STRATFX BARB J&J -B: Performed by: ORTHOPAEDIC SURGERY

## 2020-12-14 PROCEDURE — 74011000250 HC RX REV CODE- 250: Performed by: ORTHOPAEDIC SURGERY

## 2020-12-14 PROCEDURE — 77030027138 HC INCENT SPIROMETER -A

## 2020-12-14 PROCEDURE — 77030008462 HC STPLR SKN PROX J&J -A: Performed by: ORTHOPAEDIC SURGERY

## 2020-12-14 PROCEDURE — 77030010783 HC BOWL MX BN CEM J&J -B: Performed by: ORTHOPAEDIC SURGERY

## 2020-12-14 PROCEDURE — C1776 JOINT DEVICE (IMPLANTABLE): HCPCS | Performed by: ORTHOPAEDIC SURGERY

## 2020-12-14 PROCEDURE — 77030018673: Performed by: ORTHOPAEDIC SURGERY

## 2020-12-14 PROCEDURE — 74011250636 HC RX REV CODE- 250/636: Performed by: ORTHOPAEDIC SURGERY

## 2020-12-14 PROCEDURE — C9290 INJ, BUPIVACAINE LIPOSOME: HCPCS | Performed by: ORTHOPAEDIC SURGERY

## 2020-12-14 PROCEDURE — 2709999900 HC NON-CHARGEABLE SUPPLY: Performed by: ORTHOPAEDIC SURGERY

## 2020-12-14 PROCEDURE — 74011000250 HC RX REV CODE- 250: Performed by: PHYSICIAN ASSISTANT

## 2020-12-14 PROCEDURE — 76010000172 HC OR TIME 2.5 TO 3 HR INTENSV-TIER 1: Performed by: ORTHOPAEDIC SURGERY

## 2020-12-14 PROCEDURE — 74011250636 HC RX REV CODE- 250/636: Performed by: PHYSICIAN ASSISTANT

## 2020-12-14 PROCEDURE — 77030000032 HC CUF TRNQT ZIMM -B: Performed by: ORTHOPAEDIC SURGERY

## 2020-12-14 PROCEDURE — 77030040361 HC SLV COMPR DVT MDII -B

## 2020-12-14 PROCEDURE — 74011000258 HC RX REV CODE- 258: Performed by: NURSE ANESTHETIST, CERTIFIED REGISTERED

## 2020-12-14 PROCEDURE — 77030019905 HC CATH URETH INTMIT MDII -A: Performed by: ORTHOPAEDIC SURGERY

## 2020-12-14 PROCEDURE — 77030041279 HC DRSG PRMSL AG MDII -B: Performed by: ORTHOPAEDIC SURGERY

## 2020-12-14 PROCEDURE — 77030040922 HC BLNKT HYPOTHRM STRY -A

## 2020-12-14 PROCEDURE — 77030003601 HC NDL NRV BLK BBMI -A

## 2020-12-14 PROCEDURE — 74011250637 HC RX REV CODE- 250/637: Performed by: ANESTHESIOLOGY

## 2020-12-14 PROCEDURE — 2709999900 HC NON-CHARGEABLE SUPPLY

## 2020-12-14 PROCEDURE — 97116 GAIT TRAINING THERAPY: CPT

## 2020-12-14 PROCEDURE — 76060000036 HC ANESTHESIA 2.5 TO 3 HR: Performed by: ORTHOPAEDIC SURGERY

## 2020-12-14 PROCEDURE — 77030005513 HC CATH URETH FOL11 MDII -B: Performed by: ORTHOPAEDIC SURGERY

## 2020-12-14 PROCEDURE — 77030018836 HC SOL IRR NACL ICUM -A: Performed by: ORTHOPAEDIC SURGERY

## 2020-12-14 DEVICE — IMPLANTABLE DEVICE
Type: IMPLANTABLE DEVICE | Site: KNEE | Status: FUNCTIONAL
Brand: PERSONA® VIVACIT-E®

## 2020-12-14 DEVICE — IMPLANTABLE DEVICE
Type: IMPLANTABLE DEVICE | Site: KNEE | Status: FUNCTIONAL
Brand: PERSONA®

## 2020-12-14 DEVICE — IMPLANTABLE DEVICE
Type: IMPLANTABLE DEVICE | Site: KNEE | Status: FUNCTIONAL
Brand: PERSONA™

## 2020-12-14 DEVICE — IMPLANTABLE DEVICE
Type: IMPLANTABLE DEVICE | Site: KNEE | Status: FUNCTIONAL
Brand: PERSONA® NATURAL TIBIA®

## 2020-12-14 DEVICE — IMPLANTABLE DEVICE: Type: IMPLANTABLE DEVICE | Site: KNEE | Status: FUNCTIONAL

## 2020-12-14 DEVICE — SMARTSET GHV GENTAMICIN HIGH VISCOSITY BONE CEMENT 40G
Type: IMPLANTABLE DEVICE | Site: KNEE | Status: FUNCTIONAL
Brand: SMARTSET

## 2020-12-14 DEVICE — KNEE K1 TOT HEMI STD CEM IMPL CAPPED K1 ZIM: Type: IMPLANTABLE DEVICE | Site: KNEE | Status: FUNCTIONAL

## 2020-12-14 RX ORDER — OXYCODONE HYDROCHLORIDE 5 MG/1
5 TABLET ORAL AS NEEDED
Status: DISCONTINUED | OUTPATIENT
Start: 2020-12-14 | End: 2020-12-14 | Stop reason: HOSPADM

## 2020-12-14 RX ORDER — MORPHINE SULFATE 10 MG/ML
2 INJECTION, SOLUTION INTRAMUSCULAR; INTRAVENOUS
Status: DISCONTINUED | OUTPATIENT
Start: 2020-12-14 | End: 2020-12-14 | Stop reason: HOSPADM

## 2020-12-14 RX ORDER — FENTANYL CITRATE 50 UG/ML
25 INJECTION, SOLUTION INTRAMUSCULAR; INTRAVENOUS
Status: DISCONTINUED | OUTPATIENT
Start: 2020-12-14 | End: 2020-12-14 | Stop reason: HOSPADM

## 2020-12-14 RX ORDER — EPHEDRINE SULFATE/0.9% NACL/PF 50 MG/5 ML
SYRINGE (ML) INTRAVENOUS AS NEEDED
Status: DISCONTINUED | OUTPATIENT
Start: 2020-12-14 | End: 2020-12-14 | Stop reason: HOSPADM

## 2020-12-14 RX ORDER — LIDOCAINE HYDROCHLORIDE 20 MG/ML
INJECTION, SOLUTION EPIDURAL; INFILTRATION; INTRACAUDAL; PERINEURAL AS NEEDED
Status: DISCONTINUED | OUTPATIENT
Start: 2020-12-14 | End: 2020-12-14 | Stop reason: HOSPADM

## 2020-12-14 RX ORDER — SODIUM CHLORIDE 0.9 % (FLUSH) 0.9 %
5-40 SYRINGE (ML) INJECTION EVERY 8 HOURS
Status: DISCONTINUED | OUTPATIENT
Start: 2020-12-14 | End: 2020-12-15 | Stop reason: HOSPADM

## 2020-12-14 RX ORDER — PROPOFOL 10 MG/ML
INJECTION, EMULSION INTRAVENOUS AS NEEDED
Status: DISCONTINUED | OUTPATIENT
Start: 2020-12-14 | End: 2020-12-14 | Stop reason: HOSPADM

## 2020-12-14 RX ORDER — CETIRIZINE HCL 10 MG
10 TABLET ORAL DAILY
Status: DISCONTINUED | OUTPATIENT
Start: 2020-12-15 | End: 2020-12-15 | Stop reason: HOSPADM

## 2020-12-14 RX ORDER — FACIAL-BODY WIPES
10 EACH TOPICAL DAILY PRN
Status: DISCONTINUED | OUTPATIENT
Start: 2020-12-16 | End: 2020-12-15 | Stop reason: HOSPADM

## 2020-12-14 RX ORDER — OXYCODONE HYDROCHLORIDE 5 MG/1
10 TABLET ORAL
Status: DISCONTINUED | OUTPATIENT
Start: 2020-12-14 | End: 2020-12-15 | Stop reason: HOSPADM

## 2020-12-14 RX ORDER — SODIUM CHLORIDE, SODIUM LACTATE, POTASSIUM CHLORIDE, CALCIUM CHLORIDE 600; 310; 30; 20 MG/100ML; MG/100ML; MG/100ML; MG/100ML
INJECTION, SOLUTION INTRAVENOUS
Status: DISCONTINUED | OUTPATIENT
Start: 2020-12-14 | End: 2020-12-14 | Stop reason: HOSPADM

## 2020-12-14 RX ORDER — POLYETHYLENE GLYCOL 3350 17 G/17G
17 POWDER, FOR SOLUTION ORAL DAILY
Status: DISCONTINUED | OUTPATIENT
Start: 2020-12-15 | End: 2020-12-15 | Stop reason: HOSPADM

## 2020-12-14 RX ORDER — ESCITALOPRAM OXALATE 10 MG/1
20 TABLET ORAL DAILY
Status: DISCONTINUED | OUTPATIENT
Start: 2020-12-15 | End: 2020-12-15 | Stop reason: HOSPADM

## 2020-12-14 RX ORDER — OXYCODONE HYDROCHLORIDE 5 MG/1
5 TABLET ORAL
Status: DISCONTINUED | OUTPATIENT
Start: 2020-12-14 | End: 2020-12-15 | Stop reason: HOSPADM

## 2020-12-14 RX ORDER — ACETAMINOPHEN 500 MG
1000 TABLET ORAL EVERY 6 HOURS
Status: DISCONTINUED | OUTPATIENT
Start: 2020-12-14 | End: 2020-12-14

## 2020-12-14 RX ORDER — SODIUM CHLORIDE 0.9 % (FLUSH) 0.9 %
5-40 SYRINGE (ML) INJECTION AS NEEDED
Status: DISCONTINUED | OUTPATIENT
Start: 2020-12-14 | End: 2020-12-15 | Stop reason: HOSPADM

## 2020-12-14 RX ORDER — HYDROMORPHONE HYDROCHLORIDE 1 MG/ML
0.2 INJECTION, SOLUTION INTRAMUSCULAR; INTRAVENOUS; SUBCUTANEOUS
Status: DISCONTINUED | OUTPATIENT
Start: 2020-12-14 | End: 2020-12-14 | Stop reason: HOSPADM

## 2020-12-14 RX ORDER — BUPIVACAINE HYDROCHLORIDE 5 MG/ML
INJECTION, SOLUTION EPIDURAL; INTRACAUDAL AS NEEDED
Status: DISCONTINUED | OUTPATIENT
Start: 2020-12-14 | End: 2020-12-14 | Stop reason: HOSPADM

## 2020-12-14 RX ORDER — HYDROMORPHONE HYDROCHLORIDE 1 MG/ML
1 INJECTION, SOLUTION INTRAMUSCULAR; INTRAVENOUS; SUBCUTANEOUS
Status: DISPENSED | OUTPATIENT
Start: 2020-12-14 | End: 2020-12-15

## 2020-12-14 RX ORDER — VENLAFAXINE HYDROCHLORIDE 37.5 MG/1
37.5 CAPSULE, EXTENDED RELEASE ORAL
Status: DISCONTINUED | OUTPATIENT
Start: 2020-12-14 | End: 2020-12-15 | Stop reason: HOSPADM

## 2020-12-14 RX ORDER — ACETAMINOPHEN 325 MG/1
650 TABLET ORAL ONCE
Status: COMPLETED | OUTPATIENT
Start: 2020-12-14 | End: 2020-12-14

## 2020-12-14 RX ORDER — SODIUM CHLORIDE 9 MG/ML
25 INJECTION, SOLUTION INTRAVENOUS CONTINUOUS
Status: DISCONTINUED | OUTPATIENT
Start: 2020-12-14 | End: 2020-12-14 | Stop reason: HOSPADM

## 2020-12-14 RX ORDER — ROPIVACAINE HYDROCHLORIDE 5 MG/ML
INJECTION, SOLUTION EPIDURAL; INFILTRATION; PERINEURAL
Status: COMPLETED | OUTPATIENT
Start: 2020-12-14 | End: 2020-12-14

## 2020-12-14 RX ORDER — SODIUM CHLORIDE, SODIUM LACTATE, POTASSIUM CHLORIDE, CALCIUM CHLORIDE 600; 310; 30; 20 MG/100ML; MG/100ML; MG/100ML; MG/100ML
1000 INJECTION, SOLUTION INTRAVENOUS CONTINUOUS
Status: DISCONTINUED | OUTPATIENT
Start: 2020-12-14 | End: 2020-12-14 | Stop reason: HOSPADM

## 2020-12-14 RX ORDER — DEXAMETHASONE SODIUM PHOSPHATE 4 MG/ML
INJECTION, SOLUTION INTRA-ARTICULAR; INTRALESIONAL; INTRAMUSCULAR; INTRAVENOUS; SOFT TISSUE AS NEEDED
Status: DISCONTINUED | OUTPATIENT
Start: 2020-12-14 | End: 2020-12-14 | Stop reason: HOSPADM

## 2020-12-14 RX ORDER — HYDROXYZINE HYDROCHLORIDE 10 MG/1
10 TABLET, FILM COATED ORAL
Status: DISCONTINUED | OUTPATIENT
Start: 2020-12-14 | End: 2020-12-15 | Stop reason: HOSPADM

## 2020-12-14 RX ORDER — PROPOFOL 10 MG/ML
INJECTION, EMULSION INTRAVENOUS
Status: DISCONTINUED | OUTPATIENT
Start: 2020-12-14 | End: 2020-12-14 | Stop reason: HOSPADM

## 2020-12-14 RX ORDER — EPHEDRINE SULFATE/0.9% NACL/PF 50 MG/5 ML
5 SYRINGE (ML) INTRAVENOUS AS NEEDED
Status: DISCONTINUED | OUTPATIENT
Start: 2020-12-14 | End: 2020-12-14 | Stop reason: HOSPADM

## 2020-12-14 RX ORDER — NALOXONE HYDROCHLORIDE 0.4 MG/ML
0.4 INJECTION, SOLUTION INTRAMUSCULAR; INTRAVENOUS; SUBCUTANEOUS AS NEEDED
Status: DISCONTINUED | OUTPATIENT
Start: 2020-12-14 | End: 2020-12-15 | Stop reason: HOSPADM

## 2020-12-14 RX ORDER — WARFARIN SODIUM 5 MG/1
5 TABLET ORAL ONCE
Status: COMPLETED | OUTPATIENT
Start: 2020-12-14 | End: 2020-12-14

## 2020-12-14 RX ORDER — ACETAMINOPHEN 325 MG/1
650 TABLET ORAL EVERY 6 HOURS
Status: DISCONTINUED | OUTPATIENT
Start: 2020-12-14 | End: 2020-12-15 | Stop reason: HOSPADM

## 2020-12-14 RX ORDER — AMOXICILLIN 250 MG
1 CAPSULE ORAL 2 TIMES DAILY
Status: DISCONTINUED | OUTPATIENT
Start: 2020-12-14 | End: 2020-12-15 | Stop reason: HOSPADM

## 2020-12-14 RX ORDER — ONDANSETRON 2 MG/ML
4 INJECTION INTRAMUSCULAR; INTRAVENOUS
Status: DISCONTINUED | OUTPATIENT
Start: 2020-12-14 | End: 2020-12-15 | Stop reason: HOSPADM

## 2020-12-14 RX ORDER — FENTANYL CITRATE 50 UG/ML
50 INJECTION, SOLUTION INTRAMUSCULAR; INTRAVENOUS AS NEEDED
Status: DISCONTINUED | OUTPATIENT
Start: 2020-12-14 | End: 2020-12-14 | Stop reason: HOSPADM

## 2020-12-14 RX ORDER — LIDOCAINE HYDROCHLORIDE 10 MG/ML
0.1 INJECTION, SOLUTION EPIDURAL; INFILTRATION; INTRACAUDAL; PERINEURAL AS NEEDED
Status: DISCONTINUED | OUTPATIENT
Start: 2020-12-14 | End: 2020-12-14 | Stop reason: HOSPADM

## 2020-12-14 RX ORDER — MIDAZOLAM HYDROCHLORIDE 1 MG/ML
1 INJECTION, SOLUTION INTRAMUSCULAR; INTRAVENOUS AS NEEDED
Status: DISCONTINUED | OUTPATIENT
Start: 2020-12-14 | End: 2020-12-14 | Stop reason: HOSPADM

## 2020-12-14 RX ORDER — ATORVASTATIN CALCIUM 10 MG/1
20 TABLET, FILM COATED ORAL DAILY
Status: DISCONTINUED | OUTPATIENT
Start: 2020-12-15 | End: 2020-12-15 | Stop reason: HOSPADM

## 2020-12-14 RX ORDER — LISINOPRIL 10 MG/1
10 TABLET ORAL DAILY
Status: DISCONTINUED | OUTPATIENT
Start: 2020-12-15 | End: 2020-12-15 | Stop reason: HOSPADM

## 2020-12-14 RX ORDER — PANTOPRAZOLE SODIUM 40 MG/1
40 TABLET, DELAYED RELEASE ORAL
Status: DISCONTINUED | OUTPATIENT
Start: 2020-12-15 | End: 2020-12-15 | Stop reason: HOSPADM

## 2020-12-14 RX ORDER — ROPIVACAINE HYDROCHLORIDE 5 MG/ML
150 INJECTION, SOLUTION EPIDURAL; INFILTRATION; PERINEURAL AS NEEDED
Status: DISCONTINUED | OUTPATIENT
Start: 2020-12-14 | End: 2020-12-14 | Stop reason: HOSPADM

## 2020-12-14 RX ORDER — MIDAZOLAM HYDROCHLORIDE 1 MG/ML
0.5 INJECTION, SOLUTION INTRAMUSCULAR; INTRAVENOUS
Status: DISCONTINUED | OUTPATIENT
Start: 2020-12-14 | End: 2020-12-14 | Stop reason: HOSPADM

## 2020-12-14 RX ORDER — ONDANSETRON 2 MG/ML
INJECTION INTRAMUSCULAR; INTRAVENOUS AS NEEDED
Status: DISCONTINUED | OUTPATIENT
Start: 2020-12-14 | End: 2020-12-14 | Stop reason: HOSPADM

## 2020-12-14 RX ORDER — DIPHENHYDRAMINE HYDROCHLORIDE 50 MG/ML
12.5 INJECTION, SOLUTION INTRAMUSCULAR; INTRAVENOUS AS NEEDED
Status: DISCONTINUED | OUTPATIENT
Start: 2020-12-14 | End: 2020-12-14 | Stop reason: HOSPADM

## 2020-12-14 RX ORDER — SODIUM CHLORIDE 9 MG/ML
125 INJECTION, SOLUTION INTRAVENOUS CONTINUOUS
Status: DISPENSED | OUTPATIENT
Start: 2020-12-14 | End: 2020-12-15

## 2020-12-14 RX ORDER — ONDANSETRON 2 MG/ML
4 INJECTION INTRAMUSCULAR; INTRAVENOUS AS NEEDED
Status: DISCONTINUED | OUTPATIENT
Start: 2020-12-14 | End: 2020-12-14 | Stop reason: HOSPADM

## 2020-12-14 RX ORDER — SODIUM CHLORIDE, SODIUM LACTATE, POTASSIUM CHLORIDE, CALCIUM CHLORIDE 600; 310; 30; 20 MG/100ML; MG/100ML; MG/100ML; MG/100ML
100 INJECTION, SOLUTION INTRAVENOUS CONTINUOUS
Status: DISCONTINUED | OUTPATIENT
Start: 2020-12-14 | End: 2020-12-14 | Stop reason: HOSPADM

## 2020-12-14 RX ADMIN — PHENYLEPHRINE HYDROCHLORIDE 40 MCG/MIN: 10 INJECTION INTRAVENOUS at 11:03

## 2020-12-14 RX ADMIN — FENTANYL CITRATE 100 MCG: 50 INJECTION, SOLUTION INTRAMUSCULAR; INTRAVENOUS at 10:02

## 2020-12-14 RX ADMIN — SODIUM CHLORIDE, POTASSIUM CHLORIDE, SODIUM LACTATE AND CALCIUM CHLORIDE: 600; 310; 30; 20 INJECTION, SOLUTION INTRAVENOUS at 11:01

## 2020-12-14 RX ADMIN — PROPOFOL 20 MG: 10 INJECTION, EMULSION INTRAVENOUS at 11:04

## 2020-12-14 RX ADMIN — VENLAFAXINE HYDROCHLORIDE 37.5 MG: 37.5 CAPSULE, EXTENDED RELEASE ORAL at 20:58

## 2020-12-14 RX ADMIN — CEFAZOLIN SODIUM 2 G: 1 INJECTION, POWDER, FOR SOLUTION INTRAMUSCULAR; INTRAVENOUS at 20:44

## 2020-12-14 RX ADMIN — PROPOFOL 30 MG: 10 INJECTION, EMULSION INTRAVENOUS at 11:17

## 2020-12-14 RX ADMIN — OXYCODONE HYDROCHLORIDE 5 MG: 5 TABLET ORAL at 17:53

## 2020-12-14 RX ADMIN — OXYCODONE HYDROCHLORIDE 10 MG: 5 TABLET ORAL at 20:44

## 2020-12-14 RX ADMIN — HYDROMORPHONE HYDROCHLORIDE 1 MG: 1 INJECTION, SOLUTION INTRAMUSCULAR; INTRAVENOUS; SUBCUTANEOUS at 23:52

## 2020-12-14 RX ADMIN — WARFARIN SODIUM 5 MG: 5 TABLET ORAL at 17:53

## 2020-12-14 RX ADMIN — ROPIVACAINE HYDROCHLORIDE 30 ML: 5 INJECTION, SOLUTION EPIDURAL; INFILTRATION; PERINEURAL at 10:00

## 2020-12-14 RX ADMIN — WATER 2 G: 1 INJECTION INTRAMUSCULAR; INTRAVENOUS; SUBCUTANEOUS at 11:28

## 2020-12-14 RX ADMIN — SODIUM CHLORIDE, SODIUM LACTATE, POTASSIUM CHLORIDE, AND CALCIUM CHLORIDE 1000 ML: 600; 310; 30; 20 INJECTION, SOLUTION INTRAVENOUS at 09:38

## 2020-12-14 RX ADMIN — TRANEXAMIC ACID 1 G: 100 INJECTION, SOLUTION INTRAVENOUS at 11:28

## 2020-12-14 RX ADMIN — MIDAZOLAM 2 MG: 1 INJECTION INTRAMUSCULAR; INTRAVENOUS at 10:02

## 2020-12-14 RX ADMIN — ONDANSETRON HYDROCHLORIDE 4 MG: 2 INJECTION, SOLUTION INTRAMUSCULAR; INTRAVENOUS at 13:18

## 2020-12-14 RX ADMIN — DOCUSATE SODIUM 50 MG AND SENNOSIDES 8.6 MG 1 TABLET: 8.6; 5 TABLET, FILM COATED ORAL at 17:53

## 2020-12-14 RX ADMIN — SODIUM CHLORIDE 125 ML/HR: 900 INJECTION, SOLUTION INTRAVENOUS at 14:21

## 2020-12-14 RX ADMIN — BUPIVACAINE HYDROCHLORIDE 10 MG: 5 INJECTION, SOLUTION EPIDURAL; INTRACAUDAL; PERINEURAL at 11:15

## 2020-12-14 RX ADMIN — PROPOFOL 75 MCG/KG/MIN: 10 INJECTION, EMULSION INTRAVENOUS at 11:03

## 2020-12-14 RX ADMIN — ACETAMINOPHEN 650 MG: 325 TABLET ORAL at 23:52

## 2020-12-14 RX ADMIN — ACETAMINOPHEN 650 MG: 325 TABLET ORAL at 09:30

## 2020-12-14 RX ADMIN — DEXAMETHASONE SODIUM PHOSPHATE 4 MG: 4 INJECTION, SOLUTION INTRAMUSCULAR; INTRAVENOUS at 11:28

## 2020-12-14 RX ADMIN — LIDOCAINE HYDROCHLORIDE 100 MG: 20 INJECTION, SOLUTION EPIDURAL; INFILTRATION; INTRACAUDAL; PERINEURAL at 11:04

## 2020-12-14 RX ADMIN — ACETAMINOPHEN 650 MG: 325 TABLET ORAL at 17:53

## 2020-12-14 RX ADMIN — Medication 10 MG: at 11:25

## 2020-12-14 NOTE — PROGRESS NOTES
Pharmacist Note - Warfarin Dosing  Consult provided for this 64 y.o. female to manage warfarin for orthopedic surgery    INR Goal: 1.7- 2.2    Therapy Day: 1    Preop Dose: Dobzyniak- none    Drugs that may increase INR: None  Drugs that may decrease INR: None  Other current anticoagulants/ drugs that may increase bleeding risk: None  Risk factors: None  Daily INR ordered: YES    No results for input(s): HGB, INR, HGBEXT, INREXT in the last 72 hours. Date               INR                 Dose  12/8 1  --  12/14  --  5 mg     Assessment/ Plan: Will order warfarin 5 mg PO x 1 dose. Pharmacy will continue to monitor daily and adjust therapy as indicated.

## 2020-12-14 NOTE — OP NOTES
Name: Lady Duffy  MRN:  [id number]  : 1960  Age:  64 y.o. Surgery Date: 2020      OPERATIVE REPORT - LEFT TOTAL KNEE REPLACEMENT    PREOPERATIVE DIAGNOSIS: Osteoarthritis, left knee. POSTOPERATIVE DIAGNOSIS: Osteoarthritis, left knee. PROCEDURE PERFORMED: Left total knee arthroplasty. SURGEON: Iqra Perez MD    FIRST ASSISTANT: Alton Feliciano PA-C    ANESTHESIA: Spinal    PRE-OP ANTIBIOTIC: Ancef 2g    COMPLICATIONS: None. ESTIMATED BLOOD LOSS: 50 mL. SPECIMENS REMOVED: None. COMPONENTS IMPLANTED:   Implant Name Type Inv. Item Serial No.  Lot No. LRB No. Used Action   CEMENT BNE 40GM FULL DOSE PMMA W/ GENT HI VISC RADPQ LNG - SN/A  CEMENT BNE 40GM FULL DOSE PMMA W/ GENT HI VISC RADPQ LNG N/A [facility] [employer] [id number] Left 2 Implanted   SCREW BNE L25MM DIA2. 5MM KNEE FULL THRD HEX FEM PERSONA - SN/A  SCREW BNE L25MM DIA2. 5MM KNEE FULL THRD HEX FEM PERSONA N/A [employer] [id number] Left 1 Implanted   SCREW BNE L25MM DIA2. 5MM KNEE FULL THRD HEX FEM PERSONA - SN/A  SCREW BNE L25MM DIA2. 5MM KNEE FULL THRD HEX FEM PERSONA N/A Blondell Grandis [id number] Left 1 Implanted   COMPONENT FEM SZ 8 L KNEE CO CHROM RENEE CRUCE RET GAGE - SN/A  COMPONENT FEM SZ 8 L KNEE CO CHROM RENEE CRUCE RET GAGE N/A [employer] [id number] Left 1 Implanted   TIB PRN NP STM 5 DEG SZ DL -- PERSONA - SN/A  TIB PRN NP STM 5 DEG SZ DL -- PERSONA N/A [employer] [id number] Left 1 Implanted   COMPONENT PAT [id number] THK8MM STD VIVACIT-E GAGE INSET FOR - SN/A  COMPONENT PAT [id number] THK8MM STD VIVACIT-E GAGE INSET FOR N/A [employer] [id number] Left 1 Implanted   SURFACE ARTC FEM 8-9 TIB CD/CR H13MM LT VIVACIT-E POLYETH - SN/A  SURFACE ARTC FEM 8-9 TIB CD/CR H13MM LT VIVACIT-E POLYETH N/A [employer] [id number] Left 1 Implanted       INDICATIONS: The patient is an 64 yrs female with progressive debilitating left knee pain due to severe osteoarthritis.  Symptoms have progressed despite comprehensive conservative treatment and the patient presents for left total knee replacement. Risks, benefits, alternatives of the procedure were reviewed with the patient in detail and the patient desires to proceed. The patient understands the risk for perioperative medical complications. DESCRIPTION OF PROCEDURE: Spinal anesthesia was initiated. Preoperative dose of antibiotic was given. Enriquez catheter was not placed. The left lower extremity was prepped and draped in the usual sterile fashion. The skin was covered with Ioban occlusive dressing. The left lower extremity was exsanguinated. A medial parapatellar incision was made to the left knee. The left knee was exposed and the distal femur was cut at 5 degrees distal femoral valgus. Femur was sized for a size 8. An AP cutting block was placed, rotated based on bony landmarks. Femoral cuts were completed for a size 8. The tibia was subluxed and a neutral varus/valgus proximal tibial cut was made matching the patient's slope. The meniscal remnants were removed. The posterior osteophytes were removed from the femur. Gaps were examined. The flexion and extension gaps were 13 mm. The femur was finished for a 8, tibia for a D. Trials were placed. Patella was cut and a 29 mm trial was fitted . The knee tracked well with all trial implants. The trials were then removed. Bony surfaces were drilled, lavaged, and dried. All components were cemented. Excess cement was removed. A 13 mm polyethylene component was placed. After the cement had fully cured, the knee was ranged and  irrigated copiously with pulsatile lavage. All surrounding soft tissues were infiltrated with local anesthetic. The arthrotomy was closed with #2 Vicryl sutures and 0 Vicryl sutures. Skin and subcutaneous tissues were irrigated and closed in standard fashion. Sterile dressing was applied. There were no complications. The procedure was a LEFT TOTAL KNEE REPLACEMENT.  A Anne Marie total knee construct was utilized. No specimens were obtained/sent. The patient was transferred to the recovery room in stable condition. Varus release. Arvind Junior PA-C was critical throughout the case to assist with positioning, retraction and closure. There were no other available residents or surgical assistants to assist during this procedure.       Amando Rodriguez MD

## 2020-12-14 NOTE — ANESTHESIA PROCEDURE NOTES
Peripheral Block    Start time: 12/14/2020 9:51 AM  End time: 12/14/2020 9:59 AM  Performed by: Irma Liang MD  Authorized by: Irma Liang MD       Pre-procedure: Indications: at surgeon's request and post-op pain management    Preanesthetic Checklist: patient identified, risks and benefits discussed, site marked, timeout performed, anesthesia consent given and patient being monitored    Timeout Time: 09:51          Block Type:   Block Type:   Adductor canal  Laterality:  Left  Monitoring:  Standard ASA monitoring, continuous pulse ox, frequent vital sign checks, heart rate, responsive to questions and oxygen  Injection Technique:  Single shot  Procedures: ultrasound guided    Patient Position: supine  Prep: chlorhexidine    Location:  Mid thigh  Needle Type:  Stimuplex  Needle Gauge:  22 G  Needle Localization:  Ultrasound guidance  Medication Injected:  Ropivacaine (PF) (NAROPIN)(0.5%) 5 mg/mL injection, 30 mL    Assessment:  Number of attempts:  1  Injection Assessment:  Incremental injection every 5 mL, local visualized surrounding nerve on ultrasound, negative aspiration for blood, no paresthesia, no intravascular symptoms and negative aspiration for CSF  Patient tolerance:  Patient tolerated the procedure well with no immediate complications

## 2020-12-14 NOTE — PROGRESS NOTES
TRANSFER - IN REPORT:    Verbal report received from Tolu Abraham 81Fernando, RN(name) on Guillermina Garrison  being received from PACU(unit) for routine post - op      Report consisted of patients Situation, Background, Assessment and   Recommendations(SBAR). Information from the following report(s) SBAR, OR Summary, Procedure Summary, Intake/Output, MAR and Recent Results was reviewed with the receiving nurse. Opportunity for questions and clarification was provided. Assessment completed upon patients arrival to unit and care assumed.

## 2020-12-14 NOTE — ANESTHESIA PREPROCEDURE EVALUATION
Relevant Problems   No relevant active problems       Anesthetic History   No history of anesthetic complications            Review of Systems / Medical History  Patient summary reviewed, nursing notes reviewed and pertinent labs reviewed    Pulmonary        Sleep apnea: CPAP           Neuro/Psych         Psychiatric history     Cardiovascular    Hypertension                   GI/Hepatic/Renal     GERD      PUD     Endo/Other        Morbid obesity and arthritis     Other Findings              Physical Exam    Airway  Mallampati: II  TM Distance: > 6 cm  Neck ROM: normal range of motion   Mouth opening: Normal     Cardiovascular  Regular rate and rhythm,  S1 and S2 normal,  no murmur, click, rub, or gallop             Dental  No notable dental hx       Pulmonary  Breath sounds clear to auscultation               Abdominal  GI exam deferred       Other Findings            Anesthetic Plan    ASA: 3  Anesthesia type: spinal      Post-op pain plan if not by surgeon: peripheral nerve block single      Anesthetic plan and risks discussed with: Patient

## 2020-12-14 NOTE — H&P
Date of Surgery Update:  Jere You was seen and examined. History and physical has been reviewed. The patient has been examined.  There have been no significant clinical changes since the completion of the originally dated History and Physical.    Signed By: Amando Rodriguez MD     December 14, 2020 7:45 AM

## 2020-12-14 NOTE — ANESTHESIA POSTPROCEDURE EVALUATION
Post-Anesthesia Evaluation and Assessment    Patient: Davon Perdomo MRN: [id number]  SSN: [id number]    YOB: 1960  Age: 64 y.o. Sex: female      I have evaluated the patient and they are stable and ready for discharge from the PACU. Cardiovascular Function/Vital Signs  Visit Vitals  BP (!) 99/59   Pulse 80   Temp 36.5 °C (97.7 °F)   Resp 22   Ht 5' 5.5\" (1.664 m)   Wt 111.2 kg (245 lb 2.4 oz)   SpO2 97%   BMI 40.17 kg/m²       Patient is status post Spinal anesthesia for Procedure(s):  LEFT TOTAL KNEE ARTHROPLASTY. Nausea/Vomiting: None    Postoperative hydration reviewed and adequate. Pain:  Pain Scale 1: Numeric (0 - 10) (12/14/20 0917)  Pain Intensity 1: 0 (12/14/20 0917)   Managed    Neurological Status:   Neuro (WDL): Within Defined Limits (12/14/20 0951)   At baseline    Mental Status, Level of Consciousness: Alert and  oriented to person, place, and time    Pulmonary Status:   O2 Device: CO2 nasal cannula (12/14/20 9151)   Adequate oxygenation and airway patent    Complications related to anesthesia: None    Post-anesthesia assessment completed. No concerns    Signed By: Sonia Nascimento MD     December 14, 2020              Procedure(s):  LEFT TOTAL KNEE ARTHROPLASTY. spinal    <BSHSIANPOST>    INITIAL Post-op Vital signs:   Vitals Value Taken Time   /71 12/14/2020  1:45 PM   Temp 36.5 °C (97.7 °F) 12/14/2020  1:39 PM   Pulse 62 12/14/2020  1:54 PM   Resp 20 12/14/2020  1:54 PM   SpO2 100 % 12/14/2020  1:54 PM   Vitals shown include unvalidated device data.

## 2020-12-14 NOTE — ANESTHESIA PROCEDURE NOTES
Spinal Block    Start time: 12/14/2020 11:10 AM  End time: 12/14/2020 11:15 AM  Performed by: Priscilla Lao CRNA  Authorized by: Flex Gaitan MD     Pre-procedure:   Indications: primary anesthetic  Preanesthetic Checklist: patient identified, risks and benefits discussed, anesthesia consent, site marked, patient being monitored and timeout performed    Timeout Time: 11:10          Spinal Block:   Patient Position:  Seated  Prep Region:  Lumbar  Prep: Betadine and patient draped      Location:  L3-4  Technique:  Single shot        Needle:   Needle Type:  Pencil-tip  Needle Gauge:  25 G  Attempts:  1      Events: CSF confirmed, no blood with aspiration and no paresthesia        Assessment:  Insertion:  Uncomplicated  Patient tolerance:  Patient tolerated the procedure well with no immediate complications

## 2020-12-14 NOTE — PROGRESS NOTES
Problem: Mobility Impaired (Adult and Pediatric)  Goal: *Acute Goals and Plan of Care (Insert Text)  Description: FUNCTIONAL STATUS PRIOR TO ADMISSION: Patient was independent and active without use of DME.    HOME SUPPORT PRIOR TO ADMISSION: The patient lived with spouse but did not require assist.    Physical Therapy Goals  Initiated 12/14/2020    1. Patient will move from supine to sit and sit to supine  in bed with independence within 4 days. 2. Patient will perform sit to stand with modified independence within 4 days. 3. Patient will ambulate with modified independence for 150 feet with the least restrictive device within 4 days. 4. Patient will ascend/descend 8 stairs with 1 handrail(s) with modified independence within 4 days. 5. Patient will perform home exercise program per protocol with independence within 4 days. 6. Patient will demonstrate AROM 0-90 degrees in operative joint within 4 days. Outcome: Progressing Towards Goal   PHYSICAL THERAPY EVALUATION  Patient: Doc Willis [de-identified]64 y.o. female)  Date: 12/14/2020  Primary Diagnosis: Localized osteoarthritis of left knee [M17.12]  Procedure(s) (LRB):  LEFT TOTAL KNEE ARTHROPLASTY (Left) Day of Surgery   Precautions:   WBAT      ASSESSMENT  Based on the objective data described below, the patient presents with L knee pain, decreased strength and ROM L knee and minimally impaired functional mobility S/P L TKA POD 0. Pt completed bed mobility, transfers, and ambulation with rolling walker with CGA to minimal assistance. Her L knee pain increased to 7/10 with ambulation and pt requesting pain medication at session end. Anticipate pt will progress well and on target for discharge possibly tomorrow pending continued progress.      Current Level of Function Impacting Discharge (mobility/balance): bed mobility with minimal assistance to transfer back to bed, transfers CGA, ambulation with rolling walker x 80 feet with CGA     Functional Outcome Measure: The patient scored Total Score: 25/28 on the Tinetti outcome measure which is indicative of low fall risk. Other factors to consider for discharge: none, anticipate pt will progress well      Patient will benefit from skilled therapy intervention to address the above noted impairments. PLAN :  Recommendations and Planned Interventions: bed mobility training, transfer training, gait training, therapeutic exercises, and patient and family training/education      Frequency/Duration: Patient will be followed by physical therapy:  twice daily to address goals. Recommendation for discharge: (in order for the patient to meet his/her long term goals)  Physical therapy at least 2 days/week in the home     This discharge recommendation:  Has not yet been discussed the attending provider and/or case management    IF patient discharges home will need the following DME: patient owns DME required for discharge         SUBJECTIVE:   Patient stated Can I walk further.     OBJECTIVE DATA SUMMARY:   HISTORY:    Past Medical History:   Diagnosis Date    Arthritis     Essential hypertension     Family history of skin cancer     mother and sister    GERD (gastroesophageal reflux disease)     Psychiatric disorder     DEPRESSION    PUD (peptic ulcer disease)     Scarring     Skin cancer     squamous cell carcinoma     Sleep apnea     USES CPAP    Sun-damaged skin      Past Surgical History:   Procedure Laterality Date    HX  SECTION   and     HX HYSTERECTOMY  1997    HX ORTHOPAEDIC  2009    RIGHT ANKLE    HX TUBAL LIGATION         Personal factors and/or comorbidities impacting plan of care: none    Home Situation  Home Environment: Private residence  # Steps to Enter: 2  Rails to Enter: Yes  Hand Rails : Bilateral  One/Two Story Residence: [facility] level  # of Interior Steps: 8  Interior Rails: Left  Living Alone: No  Support Systems: Spouse/Significant Other/Partner  Patient Expects to be Discharged to[de-identified] Private residence  Current DME Used/Available at Home: Sandip Lied, rolling, [place], straight, Shower chair, Grab bars  Tub or Shower Type: Shower    EXAMINATION/PRESENTATION/DECISION MAKING:   Critical Behavior:     Orientation Level: Oriented X4  Cognition: Appropriate decision making, Appropriate for age attention/concentration, Appropriate safety awareness  Safety/Judgement: Good awareness of safety precautions, Insight into deficits       Skin:  dressing intact    Range Of Motion:  AROM: Generally decreased, functional L knee                       Strength:    Strength: Generally decreased, functional                    Tone & Sensation:   Tone: Normal              Sensation: Intact               Coordination:  Coordination: Within functional limits       Functional Mobility:  Bed Mobility:     Supine to Sit: Supervision;Assist x1;Additional time  Sit to Supine: Minimum assistance to lift LLE into bed;Assist x1;Additional time  Scooting: Supervision  Transfers:  Sit to Stand: Contact guard assistance;Assist x1;Additional time; Adaptive equipment  Stand to Sit: Contact guard assistance;Assist x1;Additional time; Adaptive equipment                       Balance:   Sitting: Intact  Standing: Intact; With support  Ambulation/Gait Training:  Distance (ft): 80 Feet (ft)  Assistive Device: Gait belt;Walker, rolling  Ambulation - Level of Assistance: Contact guard assistance;Assist x1   Pt assisted to bathroom and ambulated in hallway     Gait Abnormalities: Antalgic;Decreased step clearance     Left Side Weight Bearing: As tolerated  Base of Support: Widened  Stance: Left decreased  Speed/Lindsay: Slow  Step Length: Right shortened;Left shortened                   Functional Measure:  Tinetti test:    Sitting Balance: 1  Arises: 2  Attempts to Rise: 2  Immediate Standing Balance: 2  Standing Balance: 2  Nudged: 2  Eyes Closed: 1  Turn 360 Degrees - Continuous/Discontinuous: 1  Turn 360 Degrees - Steady/Unsteady: 1  Sitting Down: 2  Balance Score: 16 Balance total score  Indication of Gait: 1  R Step Length/Height: 1  L Step Length/Height: 1  R Foot Clearance: 1  L Foot Clearance: 1  Step Symmetry: 1  Step Continuity: 1  Path: 1  Trunk: 1  Walking Time: 0  Gait Score: 9 Gait total score  Total Score: 25/28 Overall total score         Tinetti Tool Score Risk of Falls  <19 = High Fall Risk  19-24 = Moderate Fall Risk  25-28 = Low Fall Risk  Tinetti ME. Performance-Oriented Assessment of Mobility Problems in Elderly Patients. Guerrier 66; [id number]. (Scoring Description: PT Bulletin Feb. 10, 1993)    Older adults: James Hammonds et al, 2009; n = 1000 [facility] elderly evaluated with ABC, ANT, ADL, and IADL)  · Mean ANT score for males aged 69-68 years = 26.21(3.40)  · Mean ANT score for females age 69-68 years = 25.16(4.30)  · Mean ANT score for males over 80 years = 23.29(6.02)  · Mean ANT score for females over 80 years = 17.20(8.32)        Physical Therapy Evaluation Charge Determination   History Examination Presentation Decision-Making   LOW Complexity : Zero comorbidities / personal factors that will impact the outcome / POC LOW Complexity : 1-2 Standardized tests and measures addressing body structure, function, activity limitation and / or participation in recreation  LOW Complexity : Stable, uncomplicated  LOW Complexity : FOTO score of       Based on the above components, the patient evaluation is determined to be of the following complexity level: LOW     Pain Rating:  Verbal: 7  Location: left knee    Activity Tolerance:   Good      After treatment patient left in no apparent distress:   Supine in bed and Call bell within reach    COMMUNICATION/EDUCATION:   The patients plan of care was discussed with: Registered nurse. Fall prevention education was provided and the patient/caregiver indicated understanding. and Patient/family agree to work toward stated goals and plan of care.     Thank you for this referral.  Mekhi Carlson S Abraham   Time Calculation: 35 mins

## 2020-12-14 NOTE — ROUTINE PROCESS
Patient: Gwen Berumen MRN: [id number]  SSN: [id number] YOB: 1960  Age: 64 y.o. Sex: female Patient is status post Procedure(s): LEFT TOTAL KNEE ARTHROPLASTY. Surgeon(s) and Role: Paramjit Ryder MD - Primary Local/Dose/Irrigation:  See [facility] Peripheral IV 12/14/20 Anterior;Left;Mid Forearm (Active) Site Assessment Clean, dry, & intact 12/14/20 1751 Phlebitis Assessment 0 12/14/20 1998 Infiltration Assessment 0 12/14/20 0620 Dressing Status Clean, dry, & intact; New 12/14/20 8377 Dressing Type Tape;Transparent 12/14/20 9992 Hub Color/Line Status Pink; Infusing 12/14/20 8681 Dressing/Packing:  Wound Knee Left-Dressing/Treatment: ABD pad;Cast padding(Aquacel) (12/14/20 1316) Splint/Cast:  ] Other:  N/A

## 2020-12-14 NOTE — PERIOP NOTES
TRANSFER - OUT REPORT:    Verbal report given to Maya Son (name) on Doc Willis  being transferred to [address] [place] [id number] (unit) for routine post - op       Report consisted of patients Situation, Background, Assessment and   Recommendations(SBAR). Time Pre op antibiotic given:1128  Anesthesia Stop time: 2068  Enriquez Present on Transfer to floor:n  Order for Enriquez on Chart:  Discharge Prescriptions with Chart:    Information from the following report(s) SBAR, OR Summary, Intake/Output, MAR and Accordion was reviewed with the receiving nurse. Opportunity for questions and clarification was provided. Is the patient on 02? YES       L/Min 2       Other     Is the patient on a monitor? NO    Is the nurse transporting with the patient? NO    Surgical Waiting Area notified of patient's transfer from PACU? YES      The following personal items collected during your admission accompanied patient upon transfer:   Dental Appliance:    Vision:    Hearing Aid:    Jewelry: Jewelry: None  Clothing: Clothing: (clothing to PACU)  Other Valuables:  Other Valuables: Home Medical Equipment(comment)(CPAP)  Valuables sent to safe:      Clothes and cpap to floor with pt

## 2020-12-15 VITALS
TEMPERATURE: 97.5 F | WEIGHT: 245.15 LBS | DIASTOLIC BLOOD PRESSURE: 97 MMHG | HEART RATE: 65 BPM | SYSTOLIC BLOOD PRESSURE: 154 MMHG | BODY MASS INDEX: 39.4 KG/M2 | HEIGHT: 66 IN | RESPIRATION RATE: 12 BRPM | OXYGEN SATURATION: 91 %

## 2020-12-15 LAB
ANION GAP SERPL CALC-SCNC: 5 MMOL/L (ref 5–15)
BUN SERPL-MCNC: 17 MG/DL (ref 6–20)
BUN/CREAT SERPL: 18 (ref 12–20)
CALCIUM SERPL-MCNC: 8.4 MG/DL (ref 8.5–10.1)
CHLORIDE SERPL-SCNC: 110 MMOL/L (ref 97–108)
CO2 SERPL-SCNC: 23 MMOL/L (ref 21–32)
CREAT SERPL-MCNC: 0.94 MG/DL (ref 0.55–1.02)
GLUCOSE SERPL-MCNC: 120 MG/DL (ref 65–100)
HGB BLD-MCNC: 11.4 G/DL (ref 11.5–16)
INR PPP: 1.1 (ref 0.9–1.1)
POTASSIUM SERPL-SCNC: 4.5 MMOL/L (ref 3.5–5.1)
PROTHROMBIN TIME: 11 SEC (ref 9–11.1)
SODIUM SERPL-SCNC: 138 MMOL/L (ref 136–145)

## 2020-12-15 PROCEDURE — 74011000250 HC RX REV CODE- 250: Performed by: PHYSICIAN ASSISTANT

## 2020-12-15 PROCEDURE — 85018 HEMOGLOBIN: CPT

## 2020-12-15 PROCEDURE — 97116 GAIT TRAINING THERAPY: CPT | Performed by: PHYSICAL THERAPIST

## 2020-12-15 PROCEDURE — 85610 PROTHROMBIN TIME: CPT

## 2020-12-15 PROCEDURE — 97165 OT EVAL LOW COMPLEX 30 MIN: CPT | Performed by: OCCUPATIONAL THERAPIST

## 2020-12-15 PROCEDURE — 36415 COLL VENOUS BLD VENIPUNCTURE: CPT

## 2020-12-15 PROCEDURE — 74011250636 HC RX REV CODE- 250/636: Performed by: PHYSICIAN ASSISTANT

## 2020-12-15 PROCEDURE — 97110 THERAPEUTIC EXERCISES: CPT | Performed by: PHYSICAL THERAPIST

## 2020-12-15 PROCEDURE — 74011250637 HC RX REV CODE- 250/637: Performed by: ORTHOPAEDIC SURGERY

## 2020-12-15 PROCEDURE — 80048 BASIC METABOLIC PNL TOTAL CA: CPT

## 2020-12-15 PROCEDURE — 97535 SELF CARE MNGMENT TRAINING: CPT | Performed by: OCCUPATIONAL THERAPIST

## 2020-12-15 PROCEDURE — 74011250637 HC RX REV CODE- 250/637: Performed by: PHYSICIAN ASSISTANT

## 2020-12-15 PROCEDURE — 99218 HC RM OBSERVATION: CPT

## 2020-12-15 RX ORDER — WARFARIN 2.5 MG/1
TABLET ORAL
Qty: 50 TAB | Refills: 0 | Status: SHIPPED | OUTPATIENT
Start: 2020-12-15

## 2020-12-15 RX ORDER — OXYCODONE HYDROCHLORIDE 10 MG/1
10 TABLET ORAL
Qty: 40 TAB | Refills: 0 | Status: SHIPPED | OUTPATIENT
Start: 2020-12-15 | End: 2020-12-25

## 2020-12-15 RX ORDER — AMOXICILLIN 250 MG
1 CAPSULE ORAL 2 TIMES DAILY
Qty: 30 TAB | Refills: 0 | Status: SHIPPED | OUTPATIENT
Start: 2020-12-15

## 2020-12-15 RX ORDER — WARFARIN SODIUM 5 MG/1
5 TABLET ORAL ONCE
Status: COMPLETED | OUTPATIENT
Start: 2020-12-15 | End: 2020-12-15

## 2020-12-15 RX ADMIN — ESCITALOPRAM OXALATE 20 MG: 10 TABLET ORAL at 09:26

## 2020-12-15 RX ADMIN — DOCUSATE SODIUM 50 MG AND SENNOSIDES 8.6 MG 1 TABLET: 8.6; 5 TABLET, FILM COATED ORAL at 17:21

## 2020-12-15 RX ADMIN — DOCUSATE SODIUM 50 MG AND SENNOSIDES 8.6 MG 1 TABLET: 8.6; 5 TABLET, FILM COATED ORAL at 09:26

## 2020-12-15 RX ADMIN — ATORVASTATIN CALCIUM 20 MG: 10 TABLET, FILM COATED ORAL at 09:26

## 2020-12-15 RX ADMIN — OXYCODONE HYDROCHLORIDE 10 MG: 5 TABLET ORAL at 07:14

## 2020-12-15 RX ADMIN — PANTOPRAZOLE SODIUM 40 MG: 40 TABLET, DELAYED RELEASE ORAL at 07:14

## 2020-12-15 RX ADMIN — OXYCODONE HYDROCHLORIDE 10 MG: 5 TABLET ORAL at 02:04

## 2020-12-15 RX ADMIN — CETIRIZINE HYDROCHLORIDE 10 MG: 10 TABLET, FILM COATED ORAL at 09:26

## 2020-12-15 RX ADMIN — CEFAZOLIN SODIUM 2 G: 1 INJECTION, POWDER, FOR SOLUTION INTRAMUSCULAR; INTRAVENOUS at 05:02

## 2020-12-15 RX ADMIN — ACETAMINOPHEN 650 MG: 325 TABLET ORAL at 17:21

## 2020-12-15 RX ADMIN — Medication 10 ML: at 13:56

## 2020-12-15 RX ADMIN — WARFARIN SODIUM 5 MG: 5 TABLET ORAL at 11:52

## 2020-12-15 RX ADMIN — HYDROMORPHONE HYDROCHLORIDE 1 MG: 1 INJECTION, SOLUTION INTRAMUSCULAR; INTRAVENOUS; SUBCUTANEOUS at 13:21

## 2020-12-15 RX ADMIN — LISINOPRIL 10 MG: 10 TABLET ORAL at 09:26

## 2020-12-15 RX ADMIN — ACETAMINOPHEN 650 MG: 325 TABLET ORAL at 11:52

## 2020-12-15 RX ADMIN — OXYCODONE HYDROCHLORIDE 10 MG: 5 TABLET ORAL at 10:52

## 2020-12-15 RX ADMIN — ACETAMINOPHEN 650 MG: 325 TABLET ORAL at 05:06

## 2020-12-15 RX ADMIN — HYDROMORPHONE HYDROCHLORIDE 1 MG: 1 INJECTION, SOLUTION INTRAMUSCULAR; INTRAVENOUS; SUBCUTANEOUS at 08:39

## 2020-12-15 RX ADMIN — OXYCODONE HYDROCHLORIDE 10 MG: 5 TABLET ORAL at 15:59

## 2020-12-15 RX ADMIN — HYDROMORPHONE HYDROCHLORIDE 1 MG: 1 INJECTION, SOLUTION INTRAMUSCULAR; INTRAVENOUS; SUBCUTANEOUS at 05:05

## 2020-12-15 NOTE — PROGRESS NOTES
Problem: Mobility Impaired (Adult and Pediatric)  Goal: *Acute Goals and Plan of Care (Insert Text)  Description: FUNCTIONAL STATUS PRIOR TO ADMISSION: Patient was independent and active without use of DME.    HOME SUPPORT PRIOR TO ADMISSION: The patient lived with spouse but did not require assist.    Physical Therapy Goals  Initiated 12/14/2020    1. Patient will move from supine to sit and sit to supine  in bed with independence within 4 days. 2. Patient will perform sit to stand with modified independence within 4 days. 3. Patient will ambulate with modified independence for 150 feet with the least restrictive device within 4 days. 4. Patient will ascend/descend 8 stairs with 1 handrail(s) with modified independence within 4 days. 5. Patient will perform home exercise program per protocol with independence within 4 days. 6. Patient will demonstrate AROM 0-90 degrees in operative joint within 4 days. 12/15/2020 1500 by Rosamaria Bowman PT, DPT  Outcome: Progressing Towards Goal  12/15/2020 1048 by Rosamaria Bowman PT, DPT  Outcome: Progressing Towards Goal   PHYSICAL THERAPY TREATMENT  Patient: Claudeen Lamer (89 y.o. female)  Date: 12/15/2020  Diagnosis: Localized osteoarthritis of left knee [M17.12]   <principal problem not specified>  Procedure(s) (LRB):  LEFT TOTAL KNEE ARTHROPLASTY (Left) 1 Day Post-Op  Precautions: Fall, WBAT  Chart, physical therapy assessment, plan of care and goals were reviewed. ASSESSMENT  Patient continues with skilled PT services and is progressing towards goals. Patient moving well and is limited only by pain. Able to perform bed mobility without difficulty and amb approx 125 feet with RW and CGA with no overt LOB. Up/down 4 stairs with 1 rail and 1 SPC with CGA. Patient has cleared PT and is safe to DC home with family support. Will continue to follow for mobility progression should DC be delayed.     Other factors to consider for discharge: at risk for falls, below functional baseline         PLAN :  Patient continues to benefit from skilled intervention to address the above impairments. Continue treatment per established plan of care. to address goals. Recommendation for discharge: (in order for the patient to meet his/her long term goals)  HH PT      IF patient discharges home will need the following DME: patient owns DME required for discharge       SUBJECTIVE:   Patient stated I am very tired but I can try.     OBJECTIVE DATA SUMMARY:   Critical Behavior:  Neurologic State: Alert, Appropriate for age  Orientation Level: Oriented X4  Cognition: Appropriate decision making, Appropriate for age attention/concentration, Appropriate safety awareness, Follows commands  Safety/Judgement: Awareness of environment, Driving appropriateness, Fall prevention, Good awareness of safety precautions, Home safety, Insight into deficits    Range of Motion:  AROM: Generally decreased, functional                         Functional Mobility Training:  Bed Mobility:     Supine to Sit: Supervision; Additional time(leg )  Sit to Supine: Supervision; Additional time(leg )  Scooting: Supervision; Additional time        Transfers:  Sit to Stand: Stand-by assistance; Additional time  Stand to Sit: Stand-by assistance; Additional time        Bed to Chair: Stand-by assistance; Additional time                    Balance:  Sitting: Intact  Standing: Intact; With support(RW)  Ambulation/Gait Training:  Distance (ft): 125 Feet (ft)  Assistive Device: Gait belt;Walker, rolling  Ambulation - Level of Assistance: Contact guard assistance        Gait Abnormalities: Antalgic;Decreased step clearance; Step to gait     Left Side Weight Bearing: As tolerated  Base of Support: Widened  Stance: Left decreased  Speed/Lindsay: Pace decreased (<100 feet/min); Slow  Step Length: Left shortened;Right shortened       Stairs:  Number of Stairs Trained: 4  Stairs - Level of Assistance: Contact guard assistance   Rail Use: Left (SPC on the R)      Pain Rating:  No c/o pain    Activity Tolerance:   Fair and requires rest breaks    After treatment patient left in no apparent distress:   Supine in bed, Call bell within reach, Caregiver / family present, and Side rails x 3    COMMUNICATION/COLLABORATION:   The patients plan of care was discussed with: Physical therapist, Occupational therapist, and Registered nurse.      Armando Holden PT, DPT   Time Calculation: 24 mins

## 2020-12-15 NOTE — PROGRESS NOTES
Primary Nurse Suad Pendleton RN and Malia Oquendo RN performed a dual skin assessment on this patient No impairment noted  Oren score is 20

## 2020-12-15 NOTE — PROGRESS NOTES
Bedside and Verbal shift change report given to Jacquelyn Unger RN (oncoming nurse) by Zeenat Ruiz RN (offgoing nurse). Report included the following information SBAR, Kardex, OR Summary, Procedure Summary, Intake/Output, MAR and Recent Results.

## 2020-12-15 NOTE — PROGRESS NOTES
ORTHO PROGRESS NOTE    December 15, 2020  Admit Date:   2020        Subjective:    Marshall Sequeira is a 64 y.o. WHITE OR  female who is 1 Day Post-Op Procedure(s):  LEFT TOTAL KNEE ARTHROPLASTY. The patient states significant pain, otherwise is without C/O at this time. Pain control is adequate. The patient denies CP, SOB, N/V. Vital Signs:    Patient Vitals for the past 8 hrs:   BP Temp Pulse Resp SpO2   12/15/20 0206 112/66 97.7 °F (36.5 °C) 82 16 96 %     Temp (24hrs), Av.7 °F (36.5 °C), Min:97.4 °F (36.3 °C), Max:98.2 °F (36.8 °C)      Pain Control:   Pain Assessment  Pain Scale 1: Numeric (0 - 10)  Pain Intensity 1: 10  Pain Onset 1: post op  Pain Location 1: Knee  Pain Orientation 1: Left  Pain Description 1: Aching  Pain Intervention(s) 1: Medication (see MAR)    LAB:    Recent Labs     12/15/20  0223   HGB 11.4*   INR 1.1      K 4.5   *   CO2 23   BUN 17   CREA 0.94   *       Assessment & Physician's Comment:  Dressing is clean, dry, and intact  Respirations unlabored  Abdomen S/NT  DF/EHL/PF intact  Distal pulses intact  calves S/NT, SILT    Procedure:  Procedure(s):  LEFT TOTAL KNEE ARTHROPLASTY    Plan:  1) Pain Control: Adequate, continue current regimen. 2) Hemodynamics: Stable. Will continue to monitor. 3) Wound: Change dressing if saturated. 4) Activity: WBAT, mobilize with assist. Likely to be slow mobilizing due to pain. 5) DVT Prophylaxis: Coumadin, SCD's, encourage ankle pump exercise, mobilize. 6) Disposition: Case management for discharge planning. Plan on home tomorrow with HH/PT. Will continue to follow progress closely.         Roro Archibald PA-C

## 2020-12-15 NOTE — PROGRESS NOTES
Bedside shift change report given to Mich Kincaid RN (oncoming nurse) by Cyndi Chappell (offgoing nurse). Report included the following information SBAR, Kardex, Procedure Summary, Intake/Output, MAR and Recent Results.

## 2020-12-15 NOTE — PROGRESS NOTES
Pharmacist Note - Warfarin Dosing  Consult provided for this 64 y.o. female to manage warfarin for Orthopedic Surgery (VTE prophylaxis). INR Goal: 1.7- 2.2    Therapy Day: 2    Drugs that may increase INR:None  Drugs that may decrease INR: None  Other current anticoagulants/ drugs that may increase bleeding risk: None  Risk factors: None  Daily INR ordered: YES    Recent Labs     12/15/20  0223   HGB 11.4*   INR 1.1     Date               INR                 Dose  12/8  1  --  12/14  --  5 mg   12/15  1.1  5 mg              Assessment/ Plan: Will order warfarin 5 mg PO x 1 dose. Pharmacy will continue to monitor daily and adjust therapy as indicated. Please contact the pharmacist at  or  for discharge recommendations if needed.

## 2020-12-15 NOTE — PROGRESS NOTES
OCCUPATIONAL THERAPY EVALUATION/DISCHARGE  Patient: Nima Garcia [de-identified]64 y.o. female)  Date: 12/15/2020  Primary Diagnosis: Localized osteoarthritis of left knee [M17.12]  Procedure(s) (LRB):  LEFT TOTAL KNEE ARTHROPLASTY (Left) 1 Day Post-Op   Precautions:  Fall, WBAT    ASSESSMENT  Based on the objective data described below, the patient presents with good safety awareness and no LOB POD 1 L TKA. She I at an overall min A level with LE ADLs and SBA fo toileting and functional mobility using RW to amb. Pt has good support from  at home. No further skilled OT required at this time. Current Level of Function (ADLs/self-care):  min A level with LE ADLs and SBA fo toileting and functional mobility using RW to amb    Functional Outcome Measure: The patient scored 60/100 on the Barthel Index outcome measure. Other factors to consider for discharge: none     PLAN :  Recommendation for discharge: (in order for the patient to meet his/her long term goals)  No skilled occupational therapy/ follow up rehabilitation needs identified at this time. This discharge recommendation:  Has not yet been discussed the attending provider and/or case management    IF patient discharges home will need the following DME: patient owns DME required for discharge       SUBJECTIVE:   Patient stated I am less sleepy.     OBJECTIVE DATA SUMMARY:   HISTORY:   Past Medical History:   Diagnosis Date    Arthritis     Essential hypertension     Family history of skin cancer     mother and sister    GERD (gastroesophageal reflux disease)     Psychiatric disorder     DEPRESSION    PUD (peptic ulcer disease)     Scarring     Skin cancer     squamous cell carcinoma     Sleep apnea     USES CPAP    Sun-damaged skin      Past Surgical History:   Procedure Laterality Date    HX  SECTION   and     HX HYSTERECTOMY  1997    HX ORTHOPAEDIC  2009    RIGHT ANKLE    HX TUBAL LIGATION         Prior Level of Function/Environment/Context: Independent, active, works  Expanded or extensive additional review of patient history:     Home Situation  Home Environment: Private residence  # Steps to Enter: 2  Rails to Enter: Yes  Hand Rails : Bilateral  One/Two Story Residence: [facility] level  # of Interior Steps: 8  Interior Rails: Left  Living Alone: No  Support Systems: Spouse/Significant Other/Partner, Child(vaibhav), Family member(s)  Patient Expects to be Discharged to[de-identified] Private residence  Current DME Used/Available at Home: Glo Eri, straight, Walker, rolling, Shower chair, Grab bars, Adaptive dressing aides(reacher)  Tub or Shower Type: Shower    Hand dominance: Right    EXAMINATION OF PERFORMANCE DEFICITS:  Cognitive/Behavioral Status:  Neurologic State: Alert; Appropriate for age  Orientation Level: Oriented X4  Cognition: Appropriate decision making; Appropriate for age attention/concentration; Appropriate safety awareness; Follows commands  Perception: Appears intact  Perseveration: No perseveration noted  Safety/Judgement: Awareness of environment;Driving appropriateness; Fall prevention;Good awareness of safety precautions; Home safety; Insight into deficits    Hearing: Auditory  Auditory Impairment: None    Vision/Perceptual:                           Acuity: Within Defined Limits    Corrective Lenses: Glasses    Range of Motion:  AROM: Generally decreased, functional                         Strength:  Strength: Generally decreased, functional                Coordination:  Coordination: Within functional limits  Fine Motor Skills-Upper: Left Intact; Right Intact    Gross Motor Skills-Upper: Left Intact; Right Intact    Tone & Sensation:  Tone: Normal  Sensation: Intact                      Balance:  Sitting: Intact  Standing: Intact; With support(RW)    Functional Mobility and Transfers for ADLs:  Bed Mobility:  Supine to Sit: Supervision; Additional time(leg )  Sit to Supine: Supervision; Additional time(leg )  Scooting: Supervision; Additional time    Transfers:  Sit to Stand: Stand-by assistance; Additional time  Stand to Sit: Stand-by assistance; Additional time  Bed to Chair: Stand-by assistance; Additional time  Bathroom Mobility: Stand-by assistance  Toilet Transfer : Stand-by assistance; Additional time  Assistive Device : Gait Belt;Walker, rolling    ADL Assessment:  Feeding: Independent    Oral Facial Hygiene/Grooming: Stand-by assistance(standing)    Bathing: Minimum assistance; Additional time;Assist x1(seated- A to reach L foot-  will assist at home)    Upper Body Dressing: Setup    Lower Body Dressing: Minimum assistance; Additional time;Assist x1(seated- A to reach L foot-  will assist at home)    Toileting: Stand by assistance                ADL Intervention and task modifications:  Cognitive Retraining  Safety/Judgement: Awareness of environment;Driving appropriateness; Fall prevention;Good awareness of safety precautions; Home safety; Insight into deficits    Bathing: Patient instructed and indicated understanding when bathing to not submerge wound in water, stand to shower or sponge bathe, cover wound with plastic and tape to ensure no water reaches bandage/wound without cues. Dressing joint: Patient instructed and demonstrated understanding to don/doff Left LE first/last with Supervision. Patient instructed and demonstrated to don all clothing while sitting prior to standing, doff all clothing to knees while standing, then sit to doff clothing off from knees to feet in order to facilitate fall prevention, pain management, and energy conservation with Supervision. Dressing joint reach exercise: To increase independence with lower body dressing, patient instructed and demonstrated to reach down Left LE in a seated position slowly to prevent tearing/shearing until slight pull is felt, hold at end range for 10 seconds, then return to starting upright position with Supervision.   Patient instructed to complete three sets of three repetitions each daily. Home safety: Patient instructed and indicated understanding on home modifications and safety (raise height of ADL objects, appropriate height of chair surfaces, recliner safety, change of floor surfaces, clear pathways) to increase independence and fall prevention. Standing: Patient instructed and demonstrated during ADLs to walk up to sink/counter top/surfaces, step into walker to increase safety of joint and fall prevention with Supervision. Patient educated about knee anatomy and educated to avoid rotation of Left LE. Instructed to apply concept to ADLs within the home (no twisting of knee during reaching across body, square off while using objects, slide objects along surfaces). Patient instructed and indicated understanding to increase amount of time standing, observe standing position during ADLs in order to increase even weight bearing through bilateral LEs in order to increase independence with ADLs. Goal to be reached 30 days post - op, per orthopedic surgeon or per PT. Tub/shower transfer: Patient instructed and indicated understanding regarding when it is safe to begin transfer into tub/shower (complete stairs with PT, advance exercises with PT high enough to clear tub/shower height). Patient instructed to use the same technique as used with stairs when entering and exiting tub/shower (\"up with the non-surgical, down with the surgical leg\"). Functional Measure:  Barthel Index:    Bathin  Bladder: 10  Bowels: 10  Groomin  Dressin  Feeding: 10  Mobility: 5  Stairs: 0  Toilet Use: 5  Transfer (Bed to Chair and Back): 10  Total: 60/100        The Barthel ADL Index: Guidelines  1. The index should be used as a record of what a patient does, not as a record of what a patient could do. 2. The main aim is to establish degree of independence from any help, physical or verbal, however minor and for whatever reason.   3. The need for supervision renders the patient not independent. 4. A patient's performance should be established using the best available evidence. Asking the patient, friends/relatives and nurses are the usual sources, but direct observation and common sense are also important. However direct testing is not needed. 5. Usually the patient's performance over the preceding 24-48 hours is important, but occasionally longer periods will be relevant. 6. Middle categories imply that the patient supplies over 50 per cent of the effort. 7. Use of aids to be independent is allowed. Daphnie Catherine., Barthel, D.W. ([id number]). Functional evaluation: the Barthel Index. [facility] (14)2. Abiola Cain avi SANJAY Stevenson, Margaux Yates., Michael Sandoval., [place], [address] [place] [address] Ave (). Measuring the change indisability after inpatient rehabilitation; comparison of the responsiveness of the Barthel Index and Functional [place] Measure. Journal of Neurology, Neurosurgery, and Psychiatry, 66(4), 842-130. Roma Chowdary, N.J.A, MAGDY Cifuentes, & Callum Chang, MShirleyA. (2004.) Assessment of post-stroke quality of life in cost-effectiveness studies: The usefulness of the Barthel Index and the EuroQoL-5D.  Quality of Life Research, 15, 192-06       Occupational Therapy Evaluation Charge Determination   History Examination Decision-Making   LOW Complexity : Brief history review  LOW Complexity : 1-3 performance deficits relating to physical, cognitive , or psychosocial skils that result in activity limitations and / or participation restrictions  LOW Complexity : No comorbidities that affect functional and no verbal or physical assistance needed to complete eval tasks       Based on the above components, the patient evaluation is determined to be of the following complexity level: LOW   Pain Ratin/10    Activity Tolerance:   Good    After treatment patient left in no apparent distress:    Sitting in chair, Call bell within reach and Caregiver / family present    COMMUNICATION/EDUCATION:   The patients plan of care was discussed with: Physical therapist and Registered nurse.      Thank you for this referral.  Edward Huston OT  Time Calculation: 21 mins

## 2020-12-15 NOTE — DISCHARGE INSTRUCTIONS
Discharge Instructions Knee Replacement  Dr. Flaca Ordonez      Patient Name  Mihaela Arizmendi  Date of procedure  12/14/2020    Procedure  Procedure(s):  LEFT TOTAL KNEE ARTHROPLASTY  Surgeon  Surgeon(s) and Role:     Cody Sanabria MD - Primary  Date of discharge: 12/15/20  PCP: Fransisca Owens NP    Follow up care   Follow up visit with Dr. Flaca Ordonez in 3 weeks. Call [phone]  to make an appointment   You have staples in your knee incision. They will be taken out in 14 days by [address] [place] [address] staff. Activity at home   Take a short walk every hour; except at night when sleeping   Do your Home Exercise Program 3 times every day    After exercising lie down and elevate your leg on pillows for 15-30 minutes to decrease swelling   Refer to your patient notebook for more information    Bathing and caring for your incision   You may take a shower with your waterproof dressing on your knee.  The waterproof dressing is to stay on your knee for 7 days.  On the 7th day have someone gently peel the dressing off by lifting the edge and stretching it to break the seal.   You may then leave your incision open to air unless you see drainage from your knee. Preventing blood clots   Take Coumadin as prescribed for 4 weeks   Call Dr. Flaca Ordonez if you have side effects of blood thinning medication: bleeding, bruising, upset stomach, or diarrhea.  Call Dr. Flaca Ordonez for signs of a blood clot in your leg: calf pain, tenderness, redness, swelling of lower leg    Preventing lung congestion   Use your incentive spirometer 4 times a day; do 10 repetitions each time   Remember to keep the small blue ball between the two arrows when taking a slow, deep breath     Pain Management   Get up and walk a short distance to relieve pain and stiffness.  Place ice wrap on your knee except when you are walking.  The gel ice packs should be changed about every 4 hours   Elevate your leg on pillows for 15-30 minutes  Take Tylenol 650mg (take two 325mg tablets) every 6 hours for pain.  If needed, take a narcotic pain pill every 4-6 hours as prescribed.  Take all medications with a small amount of food.  As your pain decreases, take the narcotics less often or take ½ of a pill   Call Dr. Fina Brice if you have side effects from your narcotic pain medication: itching, drowsiness, dizziness, upset stomach, dry mouth, constipation or if you medication is not relieving your pain. Diet after surgery   You may resume your normal diet. Include vegetables, fruit, whole grains, lean meats, and low-fat dairy products. Eat food high in fiber    Drink plenty of fluids, including 8 cups of water daily   Take Senokot-s twice a day to prevent constipation    Avoid after surgery   Do not take any over-the-counter medication for pain except Tylenol   Do not take more than 3000mg (3 grams) of Tylenol in 24 hours.  Do not drink alcoholic beverages   Do not smoke   Do not drive until seen for follow up appointment   Do not place frozen gel pack directly on your skin. It can cause frostbite.  Do not take a tub bath, swim or get in a hot tub for 6 weeks  Prevention of falls and safety at home   Set up an area where you can rest comfortably leaving space around furniture to allow you to walk with your walker   Keep stairs, hallways and bathrooms well lit; especially at night   Arrange for care for your pets   Keep your home free of clutter      Call Dr. Fina Brice at [phone] for:   Pain that is not relieved by pain medication, ice and activity   Side effects of medications   Increased/spread of bruising   Warning signs of infection:  ? persistent fever greater than 100 degrees  ?  shaking or chills  ? increased redness, tenderness, swelling or drainage from incision  ? increased pain during activity or rest   Warning signs of a blood clot in your leg:  ? increased pain in your calf  ? tenderness or redness  ? increased swelling or knee, calf, ankle or foot    Call [phone] after 5pm or on a weekend.  The on call physician will return your phone call   Call your Primary Care Doctor for:    Concerns about your medical conditions such as diabetes, high blood pressure, asthma, congestive heart failure   Blood sugars greater than 180   Persistent headache or dizziness   Coughing or congestion   Constipation or diarrhea   Burning when you go to the bathroom   Abnormal heart rate (fast or slow)     Call 911 and go to the nearest hospital for:    Sudden increased shortness of breath   Sudden onset of chest pain   Difficulty breathing   Localized chest pain with coughing or taking a deep breath

## 2020-12-15 NOTE — PROGRESS NOTES
I have reviewed discharge instructions with the patient and spouse. The patient and spouse verbalized understanding. Patient refused watching Joint Replacement video.

## 2020-12-15 NOTE — PROGRESS NOTES
Reason for Admission:   Left total knee arthroplasty                   RUR Score:   Not available                 Plan for utilizing home health:      Yes    PCP: First and Last name:  Hallie Rush NP   Name of Practice:    Are you a current patient: Yes/No: Yes   Approximate date of last visit: last month   Can you participate in a virtual visit with your PCP: yes                    Current Advanced Directive/Advance Care Plan: On chart                         Transition of Care Plan:  PENNY met with pt and her  to introduce them to the role of CM and transition of care. This pt lives at home with her . Per pt, she was independent with ADL's and IADL's prior to admission. PENNY informed pt that she has home health and offered her freedom of choice. She stated that she would like to use [facility] for her home health. CM sent a referral to [facility] via [employer]. Observation notice provided in writing to patient and/or caregiver as well as verbal explanation of the policy. Patients who are in outpatient status also receive the Observation notice.

## 2020-12-15 NOTE — PROGRESS NOTES
RENÉE- Home with [facility]. CM spoke with Yamile Noble at [facility]. She stated that she cannot accept this pt for hh. CM met with pt again to get a second choice. Pt chose [facility] for home health. CM sent a referral to [facility] and they accepted this pt for home health.  CM placed this information on pt's AVS. Nora Rose

## 2020-12-15 NOTE — PROGRESS NOTES
Problem: Mobility Impaired (Adult and Pediatric)  Goal: *Acute Goals and Plan of Care (Insert Text)  Description: FUNCTIONAL STATUS PRIOR TO ADMISSION: Patient was independent and active without use of DME.    HOME SUPPORT PRIOR TO ADMISSION: The patient lived with spouse but did not require assist.    Physical Therapy Goals  Initiated 12/14/2020    1. Patient will move from supine to sit and sit to supine  in bed with independence within 4 days. 2. Patient will perform sit to stand with modified independence within 4 days. 3. Patient will ambulate with modified independence for 150 feet with the least restrictive device within 4 days. 4. Patient will ascend/descend 8 stairs with 1 handrail(s) with modified independence within 4 days. 5. Patient will perform home exercise program per protocol with independence within 4 days. 6. Patient will demonstrate AROM 0-90 degrees in operative joint within 4 days. Outcome: Progressing Towards Goal   PHYSICAL THERAPY TREATMENT  Patient: Gwen Berumen (79 y.o. female)  Date: 12/15/2020  Diagnosis: Localized osteoarthritis of left knee [M17.12]   <principal problem not specified>  Procedure(s) (LRB):  LEFT TOTAL KNEE ARTHROPLASTY (Left) 1 Day Post-Op  Precautions: Fall, WBAT  Chart, physical therapy assessment, plan of care and goals were reviewed. ASSESSMENT  Patient continues with skilled PT services and is progressing towards goals. Patient making steady progress and limited by fatigue and pain. Currently needing supervision for bed mobility using gait belt to assist.  CGA for transfers and able to amb approx 75 feet with RW and CGA with no overt LOB but high pain levels and step-to pattern. Will plan to address stairs this PM in prep to OR home. Anticipate she will be cleared this PM as long as pain does not increase. Will continue to follow.      Other factors to consider for discharge: none         PLAN :  Patient continues to benefit from skilled intervention to address the above impairments. Continue treatment per established plan of care. to address goals. Recommendation for discharge: (in order for the patient to meet his/her long term goals)  Physical therapy at least 2 days/week in the home         IF patient discharges home will need the following DME: patient owns DME required for discharge       SUBJECTIVE:   Patient stated I am just so tired.     OBJECTIVE DATA SUMMARY:   Critical Behavior:  Neurologic State: Alert, Appropriate for age  Orientation Level: Oriented X4  Cognition: Appropriate decision making, Appropriate for age attention/concentration, Appropriate safety awareness, Follows commands  Safety/Judgement: Awareness of environment, Driving appropriateness, Fall prevention, Good awareness of safety precautions, Home safety, Insight into deficits    Range of Motion:  AROM: Generally decreased, functional                         Functional Mobility Training:  Bed Mobility:     Supine to Sit: Supervision; Additional time(leg )  Sit to Supine: Supervision; Additional time(leg )  Scooting: Supervision; Additional time        Transfers:  Sit to Stand: Stand-by assistance; Additional time  Stand to Sit: Stand-by assistance; Additional time        Bed to Chair: Stand-by assistance; Additional time                    Balance:  Sitting: Intact  Standing: Intact; With support(RW)  Ambulation/Gait Training:  Distance (ft): 75 Feet (ft)  Assistive Device: Gait belt;Walker, rolling  Ambulation - Level of Assistance: Contact guard assistance        Gait Abnormalities: Antalgic;Decreased step clearance; Step to gait     Left Side Weight Bearing: As tolerated  Base of Support: Widened  Stance: Left decreased  Speed/Lindsay: Pace decreased (<100 feet/min); Slow  Step Length: Left shortened;Right shortened                  Therapeutic Exercises:     EXERCISE   Sets   Reps   Active Active Assist   Passive Self ROM   Comments   Ankle Pumps 1 10 [] []                                        []                                        []                                           Quad Sets 1 10 []                                        []                                        []                                        []                                           Hamstring Sets   []                                        []                                        []                                        []                                           Short Arc Quads   []                                        []                                        []                                        []                                           Knee Extension Stretch     []                                          []                                          []                                          []                                           Heel Slides 1 10 []                                        []                                        []                                        []                                           Long Arc Quads   []                                        []                                        []                                        []                                           Knee Flexion Stretch   []                                        []                                        []                                        []                                           Straight Leg Raises   []                                        []                                        []                                        []                                               Pain Rating:  Reports pain but does not rate    Activity Tolerance:   Fair and requires rest breaks    After treatment patient left in no apparent distress:   Supine in bed, Call bell within reach, and Side rails x 3    COMMUNICATION/COLLABORATION: The patients plan of care was discussed with: Physical therapist, Occupational therapist, and Registered nurse.      Regan Blankenship, PT, DPT   Time Calculation: 29 mins

## 2021-06-24 ENCOUNTER — TRANSCRIBE ORDER (OUTPATIENT)
Dept: SCHEDULING | Age: 61
End: 2021-06-24

## 2021-06-24 DIAGNOSIS — Z12.31 VISIT FOR SCREENING MAMMOGRAM: Primary | ICD-10-CM

## 2021-07-01 ENCOUNTER — HOSPITAL ENCOUNTER (OUTPATIENT)
Dept: MAMMOGRAPHY | Age: 61
Discharge: HOME | End: 2021-07-01
Attending: NURSE PRACTITIONER
Payer: COMMERCIAL

## 2021-07-01 DIAGNOSIS — Z12.31 VISIT FOR SCREENING MAMMOGRAM: ICD-10-CM

## 2021-07-01 PROCEDURE — 77063 BREAST TOMOSYNTHESIS BI: CPT

## 2022-03-18 PROBLEM — N60.12 FIBROCYSTIC DISEASE OF LEFT BREAST: Status: ACTIVE | Noted: 2017-08-09

## 2022-03-19 PROBLEM — N64.4 MASTODYNIA OF LEFT BREAST: Status: ACTIVE | Noted: 2017-08-09

## 2022-03-19 PROBLEM — M17.12 LOCALIZED OSTEOARTHRITIS OF LEFT KNEE: Status: ACTIVE | Noted: 2020-12-14

## 2022-03-19 PROBLEM — Z80.41 FAMILY HISTORY OF OVARIAN CANCER: Status: ACTIVE | Noted: 2017-08-09

## 2023-05-30 ENCOUNTER — HOSPITAL ENCOUNTER (OUTPATIENT)
Facility: HOSPITAL | Age: 63
Discharge: HOME | End: 2023-06-02

## 2023-05-30 DIAGNOSIS — M25.551 RIGHT HIP PAIN: ICD-10-CM

## (undated) DEVICE — Device

## (undated) DEVICE — SUTURE VCRL SZ 2-0 L36IN ABSRB UD L40MM CT 1/2 CIR J957H

## (undated) DEVICE — TAPE,CLOTH/SILK,CURAD,3"X10YD,LF,40/CS: Brand: CURAD

## (undated) DEVICE — T4 HOOD

## (undated) DEVICE — CONTAINER,SPECIMEN,3OZ,OR STRL: Brand: MEDLINE

## (undated) DEVICE — HANDPIECE SET WITH BONE CLEANING TIP AND SUCTION TUBE: Brand: INTERPULSE

## (undated) DEVICE — BLADE SAW W0.49XL3.15IN THK0.047IN CUT THK0.047IN REPL SAG

## (undated) DEVICE — BOWL BNE CEM MIX SPAT CURET SMARTMIX CTS

## (undated) DEVICE — 4-PORT MANIFOLD: Brand: NEPTUNE 2

## (undated) DEVICE — TOTAL TRAY, 16FR 10ML SIL FOLEY, URN: Brand: MEDLINE

## (undated) DEVICE — DRAPE,EXTREMITY,89X128,STERILE: Brand: MEDLINE

## (undated) DEVICE — PADDING CST 6IN STERILE --

## (undated) DEVICE — NEEDLE HYPO 18GA L1.5IN PNK S STL HUB POLYPR SHLD REG BVL

## (undated) DEVICE — SOLUTION SURG PREP 26 CC PURPREP

## (undated) DEVICE — PADDING CAST SPEC 6INX4YD COT --

## (undated) DEVICE — SOLUTION IRRIG 1000ML H2O STRL BLT

## (undated) DEVICE — SCRUB DRY SURG EZ SCRUB BRUSH PREOPERATIVE GRN

## (undated) DEVICE — ZIMMER® STERILE DISPOSABLE TOURNIQUET CUFF WITH PLC, DUAL PORT, SINGLE BLADDER, 34 IN. (86 CM)

## (undated) DEVICE — STERILE POLYISOPRENE POWDER-FREE SURGICAL GLOVES: Brand: PROTEXIS

## (undated) DEVICE — 3M™ IOBAN™ 2 ANTIMICROBIAL INCISE DRAPE 6651EZ: Brand: IOBAN™ 2

## (undated) DEVICE — DRSG POSTOP PRMSL AG 3.5X14IN

## (undated) DEVICE — ERASECAUTI INTERMIT TRAY: Brand: MEDLINE INDUSTRIES, INC.

## (undated) DEVICE — HEADLESS TROCHAR PIN 75MM: Brand: ZUK

## (undated) DEVICE — STERILE POLYISOPRENE POWDER-FREE SURGICAL GLOVES WITH EMOLLIENT COATING: Brand: PROTEXIS

## (undated) DEVICE — BLADE SAW W073XL276IN THK0031IN CUT THK0036IN REPL SAG

## (undated) DEVICE — INFECTION CONTROL KIT SYS

## (undated) DEVICE — STAPLER SKIN LEG L3.9MM DIA0.53MM WIDE ROT HD FOR WND CLSR

## (undated) DEVICE — BANDAGE COMPR M W6INXL10YD WHT BGE VELC E MTRX HK AND LOOP

## (undated) DEVICE — SUTURE VCRL SZ 0 L36IN ABSRB VLT L40MM CT 1/2 CIR J358H

## (undated) DEVICE — SUTURE VCRL SZ 2 L54IN ABSRB UD L65MM TP-1 1/2 CIR J880T

## (undated) DEVICE — SOLUTION IRRIG 3000ML 0.9% SOD CHL FLX CONT 0797208] ICU MEDICAL INC]

## (undated) DEVICE — REM POLYHESIVE ADULT PATIENT RETURN ELECTRODE: Brand: VALLEYLAB

## (undated) DEVICE — SYR 20ML LL STRL LF --

## (undated) DEVICE — BLADE SAW W083XL354IN THK0047IN CUT THK0047IN SAG FLR

## (undated) DEVICE — STRAP,POSITIONING,KNEE/BODY,FOAM,4X60": Brand: MEDLINE

## (undated) DEVICE — SUTURE STRATAFIX SYMMETRIC PDS + SZ 1 L18IN ABSRB VLT L48MM SXPP1A400